# Patient Record
Sex: MALE | Race: WHITE | NOT HISPANIC OR LATINO | Employment: OTHER | URBAN - METROPOLITAN AREA
[De-identification: names, ages, dates, MRNs, and addresses within clinical notes are randomized per-mention and may not be internally consistent; named-entity substitution may affect disease eponyms.]

---

## 2017-02-02 RX ORDER — ATENOLOL 100 MG/1
TABLET ORAL
Qty: 90 TABLET | Refills: 0 | Status: SHIPPED | OUTPATIENT
Start: 2017-02-02 | End: 2017-04-29 | Stop reason: SDUPTHER

## 2017-02-02 RX ORDER — LOSARTAN POTASSIUM 100 MG/1
TABLET ORAL
Qty: 90 TABLET | Refills: 0 | Status: SHIPPED | OUTPATIENT
Start: 2017-02-02 | End: 2017-04-29 | Stop reason: SDUPTHER

## 2017-04-24 ENCOUNTER — OFFICE VISIT (OUTPATIENT)
Dept: CARDIOLOGY | Facility: CLINIC | Age: 67
End: 2017-04-24

## 2017-04-24 VITALS
BODY MASS INDEX: 27.55 KG/M2 | WEIGHT: 186 LBS | HEIGHT: 69 IN | OXYGEN SATURATION: 98 % | RESPIRATION RATE: 16 BRPM | DIASTOLIC BLOOD PRESSURE: 106 MMHG | SYSTOLIC BLOOD PRESSURE: 142 MMHG | HEART RATE: 70 BPM

## 2017-04-24 DIAGNOSIS — I25.10 CORONARY ARTERY DISEASE INVOLVING NATIVE CORONARY ARTERY OF NATIVE HEART WITHOUT ANGINA PECTORIS: Primary | ICD-10-CM

## 2017-04-24 DIAGNOSIS — I10 ESSENTIAL HYPERTENSION: ICD-10-CM

## 2017-04-24 DIAGNOSIS — E78.5 DYSLIPIDEMIA: ICD-10-CM

## 2017-04-24 DIAGNOSIS — I49.3 FREQUENT PVCS: ICD-10-CM

## 2017-04-24 DIAGNOSIS — R77.8 ELEVATED TROPONIN: ICD-10-CM

## 2017-04-24 PROCEDURE — 99213 OFFICE O/P EST LOW 20 MIN: CPT | Performed by: INTERNAL MEDICINE

## 2017-04-25 NOTE — PROGRESS NOTES
"Date of Office Visit: 2017  Encounter Provider: Yuan Vázquez MD  Place of Service: [facility] CARDIOLOGY  Patient Name: Jesus Islas  :1950    Chief complaint: Follow-up for coronary artery disease, PVC's, chronically elevated   troponin, and dyslipidemia.    History of Present Illness:    Dear Dr. Griggs:      I again had the pleasure of seeing your patient in cardiology office on 2017. As you   well know, he is a very pleasant 66 year-old white male with a past medical history   significant for FSH muscular dystrophy, frequent PVC's, coronary artery disease, and   dyslipidemia who presents for follow-up.      The patient was admitted on 2016 with palpitations for several days prior to the   admission. He had stated that his heart was \"skipping beats\". In the emergency   department, he was noted to have multiple PVCs, including patterns of bigeminy at   times. He had also had chest tightness and pressure 2 weeks prior to presentation,   and his troponin was elevated at 0.092 (cutoff for MI at 0.90). His troponin then came   back at 0.059 and again at 0.057 on the next 2 tests. He did wear a Holter monitor during   his hospital stay which showed an average of 110 PVCs per hour which were unifocal in   nature. He did have frequent bigeminal episodes, but no ventricular tachycardia. His TSH   was checked and was normal, and he was continued on his home dose of atenolol. He   then had a Lexiscan Cardiolite stress test performed on 2016 which showed   ischemia at the distal inferior wall and apex. A subsequent left heart catheterization was   performed on 2016 by Dr. August which showed significant disease in the LAD.   Specifically, he had 30% proximal disease, 50% mid disease, and 80-90% disease in the   apical portion of the LAD. However, the LAD was felt to be too small to intervene in the   apical area, and medical therapy was recommended. His " ejection fraction on the   ventriculogram was 55%. He was started on a very low dose of pravastatin at 10 mg per  day given his muscular dystrophy and a history of myalgias with statins in the past after   being diagnosed with this. He also has a history of significant dyslipidemia, including   grossly elevated triglycerides (greater than 1100 in the past), elevated LDL, and a low   HDL. It was later determined that he does have a chronically elevated troponin.      The patient presents today for follow-up.  Overall, he has been doing fairly well.    However, he has been having a significant amount of short-term memory loss.  He   states that his wife did some research and found that pravastatin can occasionally   be associated with this.  He has been exercising, and states that he has not had   any chest discomfort or significant shortness of breath.  He has been under a   significant amount of stress as his mother-in-law recently passed away.    Past Medical History:   Diagnosis Date   • Arthritis    • BPH (benign prostatic hyperplasia)    • CAD (coronary artery disease)    • Coronary artery disease     Cath 5/6/16: LAD 30% prox, 50% mid, 80-90% apical (small portion of vessel).  LCx and RCA both with up to 30% disease.  Medical management recommended at that time.   • Diverticulosis of colon    • Dyslipidemia     Including severe hypertriglyceridemia.  Also with elevated LDL and low HDL.   • Elevated troponin     Chronically elevated troponin (likely from muscular dystrophy)   • FSH (facioscapulohumeral muscular dystrophy)    • Hearing aid worn     Bilateral    • Hypertension    • Hypogonadism male    • SANDRA on CPAP    • PVC (premature ventricular contraction)     Frequent        Past Surgical History:   Procedure Laterality Date   • CARDIAC CATHETERIZATION N/A 5/6/2016    Procedure: Coronary angiography;  Surgeon: Ly August MD;  Location: [facility] CATH INVASIVE LOCATION;  Service:    • CARDIAC CATHETERIZATION  N/A 5/6/2016    Procedure: Left ventriculography;  Surgeon: Ly August MD;  Location: [facility] CATH INVASIVE LOCATION;  Service:    • CARDIAC CATHETERIZATION N/A 5/6/2016    Procedure: Left Heart Cath;  Surgeon: Ly August MD;  Location: [facility] CATH INVASIVE LOCATION;  Service:    • HERNIA REPAIR      Bilateral inguinal hernia repair and umbilical hernia repair in past    • [place] RT/LT HEART CATHETERS N/A 5/6/2016    Procedure: Percutaneous Coronary Intervention;  Surgeon: Ly August MD;  Location: [facility] CATH INVASIVE LOCATION;  Service: Cardiovascular       Current Outpatient Prescriptions on File Prior to Visit   Medication Sig Dispense Refill   • ASPIRIN LOW DOSE 81 MG EC tablet TAKE ONE TABLET BY MOUTH DAILY 30 tablet 6   • atenolol (TENORMIN) 100 MG tablet TAKE ONE TABLET BY MOUTH DAILY 90 tablet 0   • losartan (COZAAR) 100 MG tablet TAKE ONE TABLET BY MOUTH DAILY 90 tablet 0   • pravastatin (PRAVACHOL) 20 MG tablet TAKE ONE TABLET BY MOUTH DAILY 30 tablet 3   • tadalafil (CIALIS) 5 MG tablet Take  by mouth.     • Testosterone 20.25 MG/ACT (1.62%) gel Place  on the skin.       No current facility-administered medications on file prior to visit.      Allergies as of 04/24/2017 - Jesus as Reviewed 04/24/2017   Allergen Reaction Noted   • No known drug allergy  02/26/2016     Social History     Social History   • Marital status:      Spouse name: N/A   • Number of children: N/A   • Years of education: N/A     Occupational History   • Not on file.     Social History Main Topics   • Smoking status: Never Smoker   • Smokeless tobacco: Never Used   • Alcohol use Yes      Comment: 3 beers on weekends   • Drug use: No   • Sexual activity: Defer     Other Topics Concern   • Not on file     Social History Narrative     Family History   Problem Relation Age of Onset   • Aneurysm Father 50     Ruptured cerebral aneurysm   • Cancer Sister    • Early death Sister        Review of Systems   All other systems  "reviewed and are negative.    Objective:     Vitals:    04/24/17 1120   BP: (!) 142/106   BP Location: Right arm   Patient Position: Sitting   Cuff Size: Adult   Pulse: 70   Resp: 16   SpO2: 98%   Weight: 186 lb (84.4 kg)   Height: 69\" (175.3 cm)     Body mass index is 27.47 kg/(m^2).    Physical Exam   Constitutional: He is oriented to person, place, and time. He appears well-developed and well-nourished.   HENT:   Head: Normocephalic and atraumatic.   Eyes: Conjunctivae are normal.   Neck: Neck supple.   Cardiovascular: Normal rate and regular rhythm.  Exam reveals no gallop and no friction rub.    No murmur heard.  Pulmonary/Chest: Effort normal and breath sounds normal.   Abdominal: Soft. There is no tenderness.   Musculoskeletal: He exhibits no edema.   Neurological: He is alert and oriented to person, place, and time.   Skin: Skin is warm.   Psychiatric: He has a normal mood and affect. His behavior is normal.     Lab Review:   Procedures    Cardiac Procedures:  1. Holter monitor on 5/5/2016: The average heart rate was 60 bpm with a minimum   heart rate of 44 bpm, and a maximum heart rate of 88 bpm. There were an average  of 110 PVCs per hour (which were unifocal). There was frequent ventricular bigeminy.   There were no ventricular arrhythmias.  2. Lexiscan Cardiolite stress test on 5/6/2016: There was a small to moderate size   and moderately reversible defect in the distal inferior wall and apex consistent with   ischemia.  3. Left heart catheterization on 5/6/2016 by Dr. August: The left main was normal. The   left circumflex had 30% proximal disease area the first obtuse marginal branch had   30% mid disease. The LAD had a 30% proximal lesion, 50% mid lesion, and 80-90%   disease near the apex (small portion of the vessel). Overall, the LAD appeared diffusely   diseased and severely calcified. The right coronary artery had 30% mid disease and 10%   distal disease. The ejection fraction was 55% on the " ventriculogram. Medical management   was recommended at that time.    Assessment:       Diagnosis Plan   1. Coronary artery disease involving native coronary artery of native heart without angina pectoris     2. Elevated troponin     3. Dyslipidemia     4. Essential hypertension       Plan:       As noted, the patient's main issue has been short term memory loss recently.  He is   concerned as there has been some studies have evidently shown memory loss with   pravastatin.  I feel that it is acceptable to hold the pravastatin for a trial period of 4-6   weeks to see if this improves.  If his memory does not improve, then he can simply   go back on the pravastatin for now.  Obviously, control of his cholesterol is important   given his coronary artery disease.  He has also tolerated low doses of Lipitor in the   past as well, which would be another option.  He will continue on the aspirin for his   coronary artery disease, as well as the atenolol.  His blood pressure has been   well-controlled at home, although it is slightly elevated today.  He states he will   watch this.  I will see him back in 6 months, and he will let me know about the   pravastatin in the near future.    Coronary Artery Disease  Assessment  • The patient has no angina    Plan  • Lifestyle modifications discussed include adhering to a heart healthy diet, avoidance of tobacco products, maintenance of a healthy weight, medication compliance, regular exercise and regular monitoring of cholesterol and blood pressure    Subjective - Objective  • Current antiplatelet therapy includes aspirin 81 mg

## 2017-04-26 RX ORDER — PRAVASTATIN SODIUM 20 MG
TABLET ORAL
Qty: 30 TABLET | Refills: 3 | Status: SHIPPED | OUTPATIENT
Start: 2017-04-26 | End: 2017-08-07 | Stop reason: SINTOL

## 2017-05-01 RX ORDER — LOSARTAN POTASSIUM 100 MG/1
TABLET ORAL
Qty: 90 TABLET | Refills: 0 | Status: SHIPPED | OUTPATIENT
Start: 2017-05-01 | End: 2017-07-26 | Stop reason: SDUPTHER

## 2017-05-01 RX ORDER — ATENOLOL 100 MG/1
TABLET ORAL
Qty: 90 TABLET | Refills: 0 | Status: SHIPPED | OUTPATIENT
Start: 2017-05-01 | End: 2017-07-26 | Stop reason: SDUPTHER

## 2017-06-13 ENCOUNTER — TELEPHONE (OUTPATIENT)
Dept: CARDIOLOGY | Facility: CLINIC | Age: 67
End: 2017-06-13

## 2017-06-13 NOTE — TELEPHONE ENCOUNTER
Pt called with update on being off of Pravastatin for 1 month. He states his memory loss has much improved since being off Pravastatin.   He would like to discuss what to do next.   Please advise   Pt's call back # [phone]  Thanks Abdias CORNEJO

## 2017-06-15 NOTE — TELEPHONE ENCOUNTER
Abdias,    Because his symptoms were so profound, I think I an going to leave him off statins altogether for now.  Could you please let him know?  Thanks    GM

## 2017-06-16 NOTE — TELEPHONE ENCOUNTER
I spoke with pt gave him information about hold statins.   He was ok with it.   He did mention that since stopping Pravastatin he has had issues with ED.  He states the Pravastatin has helped with ED.   He will call if this gets worse or other issues arise before his next appt.   Abdias grewal

## 2017-07-26 RX ORDER — LOSARTAN POTASSIUM 100 MG/1
TABLET ORAL
Qty: 90 TABLET | Refills: 0 | Status: SHIPPED | OUTPATIENT
Start: 2017-07-26 | End: 2017-10-21 | Stop reason: SDUPTHER

## 2017-07-26 RX ORDER — ATENOLOL 100 MG/1
TABLET ORAL
Qty: 90 TABLET | Refills: 0 | Status: SHIPPED | OUTPATIENT
Start: 2017-07-26 | End: 2018-01-08 | Stop reason: SDUPTHER

## 2017-08-07 DIAGNOSIS — I25.10 CORONARY ARTERY DISEASE INVOLVING NATIVE CORONARY ARTERY OF NATIVE HEART WITHOUT ANGINA PECTORIS: Primary | ICD-10-CM

## 2017-08-08 ENCOUNTER — LAB (OUTPATIENT)
Dept: LAB | Facility: HOSPITAL | Age: 67
End: 2017-08-08

## 2017-08-08 DIAGNOSIS — I25.10 CORONARY ARTERY DISEASE INVOLVING NATIVE CORONARY ARTERY OF NATIVE HEART WITHOUT ANGINA PECTORIS: ICD-10-CM

## 2017-08-08 LAB
ALBUMIN SERPL-MCNC: 4 G/DL (ref 3.5–5.2)
ALP SERPL-CCNC: 46 U/L (ref 39–117)
ALT SERPL W P-5'-P-CCNC: 21 U/L (ref 1–41)
ARTICHOKE IGE QN: 199 MG/DL (ref 0–100)
AST SERPL-CCNC: 16 U/L (ref 1–40)
BILIRUB CONJ SERPL-MCNC: <0.2 MG/DL (ref 0–0.3)
BILIRUB INDIRECT SERPL-MCNC: NORMAL MG/DL
BILIRUB SERPL-MCNC: 0.4 MG/DL (ref 0.1–1.2)
CHOLEST SERPL-MCNC: 302 MG/DL (ref 0–200)
CK SERPL-CCNC: 220 U/L (ref 20–200)
HDLC SERPL-MCNC: 41 MG/DL (ref 40–60)
LDLC SERPL CALC-MCNC: ABNORMAL MG/DL (ref 0–100)
LDLC/HDLC SERPL: ABNORMAL {RATIO}
PROT SERPL-MCNC: 7 G/DL (ref 6–8.5)
TRIGL SERPL-MCNC: 411 MG/DL (ref 0–150)
VLDLC SERPL-MCNC: ABNORMAL MG/DL (ref 5–40)

## 2017-08-08 PROCEDURE — 36415 COLL VENOUS BLD VENIPUNCTURE: CPT

## 2017-08-08 PROCEDURE — 82550 ASSAY OF CK (CPK): CPT

## 2017-08-08 PROCEDURE — 80076 HEPATIC FUNCTION PANEL: CPT

## 2017-08-08 PROCEDURE — 83721 ASSAY OF BLOOD LIPOPROTEIN: CPT

## 2017-08-08 PROCEDURE — 80061 LIPID PANEL: CPT

## 2017-10-11 ENCOUNTER — TELEPHONE (OUTPATIENT)
Dept: CARDIOLOGY | Facility: CLINIC | Age: 67
End: 2017-10-11

## 2017-10-11 NOTE — TELEPHONE ENCOUNTER
10/11/17  3:00 PM  Jesus Islas  1950    Home Phone [phone]   Mobile [phone]     Mr. Islas has a follow-up appt with Dr. Vázquez on 10/24. He wants to know if he should have lipid testing prior to this appt?    Emmy DENNIS RN

## 2017-10-13 DIAGNOSIS — I25.10 CORONARY ARTERY DISEASE INVOLVING NATIVE CORONARY ARTERY OF NATIVE HEART WITHOUT ANGINA PECTORIS: Primary | ICD-10-CM

## 2017-10-13 NOTE — TELEPHONE ENCOUNTER
Emmy,    Can you please let the patient know that I have placed an order for a lipid panel?  He should be fasting, and can go anytime prior to his appointment.  Thanks    RIMMA

## 2017-10-16 ENCOUNTER — LAB (OUTPATIENT)
Dept: LAB | Facility: HOSPITAL | Age: 67
End: 2017-10-16

## 2017-10-16 DIAGNOSIS — I25.10 CORONARY ARTERY DISEASE INVOLVING NATIVE CORONARY ARTERY OF NATIVE HEART WITHOUT ANGINA PECTORIS: ICD-10-CM

## 2017-10-16 LAB
ARTICHOKE IGE QN: 153 MG/DL (ref 0–100)
CHOLEST SERPL-MCNC: 279 MG/DL (ref 0–200)
HDLC SERPL-MCNC: 35 MG/DL (ref 40–60)
LDLC SERPL CALC-MCNC: ABNORMAL MG/DL (ref 0–100)
LDLC/HDLC SERPL: ABNORMAL {RATIO}
TRIGL SERPL-MCNC: 637 MG/DL (ref 0–150)
VLDLC SERPL-MCNC: ABNORMAL MG/DL (ref 5–40)

## 2017-10-16 PROCEDURE — 83721 ASSAY OF BLOOD LIPOPROTEIN: CPT

## 2017-10-16 PROCEDURE — 36415 COLL VENOUS BLD VENIPUNCTURE: CPT

## 2017-10-16 PROCEDURE — 80061 LIPID PANEL: CPT

## 2017-10-23 RX ORDER — LOSARTAN POTASSIUM 100 MG/1
TABLET ORAL
Qty: 90 TABLET | Refills: 0 | Status: SHIPPED | OUTPATIENT
Start: 2017-10-23 | End: 2018-01-19 | Stop reason: SDUPTHER

## 2017-10-24 ENCOUNTER — OFFICE VISIT (OUTPATIENT)
Dept: CARDIOLOGY | Facility: CLINIC | Age: 67
End: 2017-10-24

## 2017-10-24 VITALS
OXYGEN SATURATION: 99 % | HEART RATE: 54 BPM | SYSTOLIC BLOOD PRESSURE: 140 MMHG | HEIGHT: 69 IN | BODY MASS INDEX: 26.36 KG/M2 | WEIGHT: 178 LBS | DIASTOLIC BLOOD PRESSURE: 102 MMHG

## 2017-10-24 DIAGNOSIS — I10 ESSENTIAL HYPERTENSION: ICD-10-CM

## 2017-10-24 DIAGNOSIS — E78.5 HYPERLIPIDEMIA, UNSPECIFIED HYPERLIPIDEMIA TYPE: ICD-10-CM

## 2017-10-24 DIAGNOSIS — I49.3 FREQUENT PVCS: ICD-10-CM

## 2017-10-24 DIAGNOSIS — I25.10 CORONARY ARTERY DISEASE INVOLVING NATIVE CORONARY ARTERY OF NATIVE HEART WITHOUT ANGINA PECTORIS: Primary | ICD-10-CM

## 2017-10-24 DIAGNOSIS — E78.1 HYPERTRIGLYCERIDEMIA: ICD-10-CM

## 2017-10-24 PROCEDURE — 99214 OFFICE O/P EST MOD 30 MIN: CPT | Performed by: INTERNAL MEDICINE

## 2017-10-24 RX ORDER — ATORVASTATIN CALCIUM 20 MG/1
20 TABLET, FILM COATED ORAL DAILY
Qty: 30 TABLET | Refills: 11 | Status: SHIPPED | OUTPATIENT
Start: 2017-10-24 | End: 2018-01-16

## 2017-10-24 RX ORDER — ATENOLOL 50 MG/1
TABLET ORAL
COMMUNITY
Start: 2017-07-28 | End: 2017-10-24 | Stop reason: DRUGHIGH

## 2017-10-26 RX ORDER — ATENOLOL 50 MG/1
TABLET ORAL
Qty: 180 TABLET | Refills: 0 | OUTPATIENT
Start: 2017-10-26

## 2017-10-29 NOTE — PROGRESS NOTES
"Date of Office Visit: 10/24/2017  Encounter Provider: Yuan Vázquez MD  Place of Service: [facility] CARDIOLOGY  Patient Name: Jesus Islas  :1950    Chief complaint: Follow-up coronary artery disease, PVCs, chronically elevated troponin,   dyslipidemia, and hypertriglyceridemia.    History of Present Illness:    Dear Dr. Griggs:      I again had the pleasure of seeing your patient in cardiology office on 10/24/2017. As you   well know, he is a very pleasant 67 year-old white male with a past medical history   significant for FSH muscular dystrophy, frequent PVC's, coronary artery disease, and   dyslipidemia who presents for follow-up.       The patient was admitted on 2016 with palpitations for several days prior to the   admission. He had stated that his heart was \"skipping beats\". In the emergency   department, he was noted to have multiple PVCs, including patterns of bigeminy at   times. He had also had chest tightness and pressure 2 weeks prior to presentation,   and his troponin was elevated at 0.092 (cutoff for MI at 0.90). His troponin then came   back at 0.059 and again at 0.057 on the next 2 tests. He did wear a Holter monitor during   his hospital stay which showed an average of 110 PVCs per hour which were unifocal in   nature. He did have frequent bigeminal episodes, but no ventricular tachycardia. His TSH   was checked and was normal, and he was continued on his home dose of atenolol. He   then had a Lexiscan Cardiolite stress test performed on 2016 which showed   ischemia at the distal inferior wall and apex. A subsequent left heart catheterization was   performed on 2016 by Dr. August which showed significant disease in the LAD.   Specifically, he had 30% proximal disease, 50% mid disease, and 80-90% disease in the   apical portion of the LAD. However, the LAD was felt to be too small to intervene in the   apical area, and medical therapy was " recommended. His ejection fraction on the   ventriculogram was 55%. He was started on a very low dose of pravastatin at 10 mg per  day given his muscular dystrophy and a history of myalgias with statins in the past after   being diagnosed with this. He also has a history of significant dyslipidemia, including   grossly elevated triglycerides (greater than 1100 in the past), elevated LDL, and a low   HDL. It was later determined that he does have a chronically elevated troponin.      The patient presents today for follow-up.  From a symptomatic standpoint, he is doing   well.  He has had no chest pain or shortness of breath.  He does state that his mind   cleared after getting off of the pravastatin.  However, his most recent lipid panel on   10/16/2017 showed significant issues.  Specifically, his triglycerides were 637, LDL   153, HDL 35, and total cholesterol 279.    Past Medical History:   Diagnosis Date   • Arthritis    • BPH (benign prostatic hyperplasia)    • CAD (coronary artery disease)    • Coronary artery disease     Cath 5/6/16: LAD 30% prox, 50% mid, 80-90% apical (small portion of vessel).  LCx and RCA both with up to 30% disease.  Medical management recommended at that time.   • Diverticulosis of colon    • Dyslipidemia     Including severe hypertriglyceridemia.  Also with elevated LDL and low HDL.   • Elevated troponin     Chronically elevated troponin (likely from muscular dystrophy)   • FSH (facioscapulohumeral muscular dystrophy)    • Hearing aid worn     Bilateral    • Hypertension    • Hypogonadism male    • SANDRA on CPAP    • PVC (premature ventricular contraction)     Frequent        Past Surgical History:   Procedure Laterality Date   • CARDIAC CATHETERIZATION N/A 5/6/2016    Procedure: Coronary angiography;  Surgeon: Ly August MD;  Location: [facility] INVASIVE LOCATION;  Service:    • CARDIAC CATHETERIZATION N/A 5/6/2016    Procedure: Left ventriculography;  Surgeon: Ly August MD;   [facility] CATH INVASIVE LOCATION;  Service:    • CARDIAC CATHETERIZATION N/A 5/6/2016    Procedure: Left Heart Cath;  Surgeon: Ly August MD;  Location[facility] CATH INVASIVE LOCATION;  Service:    • HERNIA REPAIR      Bilateral inguinal hernia repair and umbilical hernia repair in past    • [place] RT/LT HEART CATHETERS N/A 5/6/2016    Procedure: Percutaneous Coronary Intervention;  Surgeon: Ly August MD;  Location: [facility] CATH INVASIVE LOCATION;  Service: Cardiovascular       Current Outpatient Prescriptions on File Prior to Visit   Medication Sig Dispense Refill   • ASPIRIN LOW DOSE 81 MG EC tablet TAKE ONE TABLET BY MOUTH DAILY 30 tablet 6   • atenolol (TENORMIN) 100 MG tablet TAKE ONE TABLET BY MOUTH DAILY 90 tablet 0   • losartan (COZAAR) 100 MG tablet TAKE ONE TABLET BY MOUTH DAILY 90 tablet 0   • tadalafil (CIALIS) 5 MG tablet Take  by mouth.     • Testosterone 20.25 MG/ACT (1.62%) gel Place  on the skin.       No current facility-administered medications on file prior to visit.      Allergies as of 10/24/2017 - Jesus as Reviewed 10/24/2017   Allergen Reaction Noted   • Pravastatin  10/24/2017     Social History     Social History   • Marital status:      Spouse name: N/A   • Number of children: N/A   • Years of education: N/A     Occupational History   • Not on file.     Social History Main Topics   • Smoking status: Never Smoker   • Smokeless tobacco: Never Used   • Alcohol use Yes      Comment: 3 beers on weekends   • Drug use: No   • Sexual activity: Defer     Other Topics Concern   • Not on file     Social History Narrative     Family History   Problem Relation Age of Onset   • Aneurysm Father 50     Ruptured cerebral aneurysm   • Cancer Sister    • Early death Sister        Review of Systems   All other systems reviewed and are negative.    Objective:     Vitals:    10/24/17 1140   BP: (!) 140/102   BP Location: Left arm   Pulse: 54   SpO2: 99%   Weight: 178 lb (80.7 kg)   Height:  "69\" (175.3 cm)     Body mass index is 26.29 kg/(m^2).    Physical Exam   Constitutional: He is oriented to person, place, and time. He appears well-developed and well-nourished.   HENT:   Head: Normocephalic and atraumatic.   Eyes: Conjunctivae are normal.   Neck: Neck supple.   Cardiovascular: Normal rate and regular rhythm.  Exam reveals no gallop and no friction rub.    No murmur heard.  Pulmonary/Chest: Effort normal and breath sounds normal.   Abdominal: Soft. There is no tenderness.   Musculoskeletal: He exhibits no edema.   Neurological: He is alert and oriented to person, place, and time.   Skin: Skin is warm.   Psychiatric: He has a normal mood and affect. His behavior is normal.     Lab Review:   Procedures    Cardiac Procedures:  1. Holter monitor on 5/5/2016: The average heart rate was 60 bpm with a minimum   heart rate of 44 bpm, and a maximum heart rate of 88 bpm. There were an average  of 110 PVCs per hour (which were unifocal). There was frequent ventricular bigeminy.   There were no ventricular arrhythmias.  2. Lexiscan Cardiolite stress test on 5/6/2016: There was a small to moderate size   and moderately reversible defect in the distal inferior wall and apex consistent with   ischemia.  3. Left heart catheterization on 5/6/2016 by Dr. August: The left main was normal. The   left circumflex had 30% proximal disease area the first obtuse marginal branch had   30% mid disease. The LAD had a 30% proximal lesion, 50% mid lesion, and 80-90%   disease near the apex (small portion of the vessel). Overall, the LAD appeared   diffusely diseased and severely calcified. The right coronary artery had 30% mid   disease and 10% distal disease. The ejection fraction was 55% on the   ventriculogram. Medical management was recommended at that time.    Assessment:       Diagnosis Plan   1. Coronary artery disease involving native coronary artery of native heart without angina pectoris  Lipid Panel    CK    Hepatic " Function Panel   2. Hyperlipidemia, unspecified hyperlipidemia type  Lipid Panel    CK    Hepatic Function Panel   3. Hypertriglyceridemia  Lipid Panel    CK    Hepatic Function Panel   4. Essential hypertension     5. Frequent PVCs       Plan:       From a symptomatic standpoint, the patient is actually doing very well.  He has not   felt any recent PVCs, and he has had no chest pain or other exertional symptoms.    However, his lipid panel from 10/16/2017 showed significant dyslipidemia.  His   triglycerides were 637, and they had previously been 411.  Again, he has had   triglyceride levels as high as 1100 and the past.  His total cholesterol was 279,   HDL 35, and .  He did have significant memory and mental issues while   taking pravastatin.  However, he states that he did fairly well with Lipitor in the past,   and he would like to retry this.  I did discuss the possibility of specific directed   triglyceride therapy, although he would prefer to restart the Lipitor at this point.  He   had experienced some dietary indiscretions and had recently had some beer as   well around the time that the lipid panel was checked.  He is going to try to make   dietary modifications as well for the triglycerides.  He will have his lipid panel, CK   level, and liver function tests checked in 4-6 weeks, and adjustments can be   made at that time.  He will continue on the aspirin at 81 mg per day.  He will also   continue on the losartan and atenolol.  I will see him back in the office in 6 months,   and his lipid therapy will be readjusted after his labs in 4-6 weeks.    Coronary Artery Disease  Assessment  • The patient has no angina   Plan  • Lifestyle modifications discussed include adhering to a heart healthy diet, avoidance of tobacco products, maintenance of a healthy weight, medication compliance, regular exercise and regular monitoring of cholesterol and blood pressure   Subjective - Objective  • Current  antiplatelet therapy includes aspirin 81 mg

## 2018-01-08 ENCOUNTER — TELEPHONE (OUTPATIENT)
Dept: FAMILY MEDICINE CLINIC | Facility: CLINIC | Age: 68
End: 2018-01-08

## 2018-01-08 RX ORDER — ATENOLOL 100 MG/1
100 TABLET ORAL DAILY
Qty: 30 TABLET | Refills: 0 | Status: SHIPPED | OUTPATIENT
Start: 2018-01-08 | End: 2018-01-31 | Stop reason: SDUPTHER

## 2018-01-12 ENCOUNTER — TELEPHONE (OUTPATIENT)
Dept: CARDIOLOGY | Facility: CLINIC | Age: 68
End: 2018-01-12

## 2018-01-12 NOTE — TELEPHONE ENCOUNTER
01/12/18  1:36 PM  Jesus Islas  1950    Home Phone [phone]   Mobile [phone]     Mr. Islas calls to report loss of strength in upper extremities since taking atorvastatin. I spoke with Dr. áVzquez about pt who advises that he stop atorvastatin and he will look into repatha for this patient. Mr. Islas is reluctant to try repatha and wants to try half dose atorvastatin if next lipid panel shows improvement. This is ok per Dr. Vázquez.    Mr. Islas will get lipids drawn next week and await results. He will try 10mg atorvastatin daily if labs are good.    Emmy DENNIS RN

## 2018-01-15 ENCOUNTER — LAB (OUTPATIENT)
Dept: LAB | Facility: HOSPITAL | Age: 68
End: 2018-01-15

## 2018-01-15 DIAGNOSIS — E78.1 HYPERTRIGLYCERIDEMIA: ICD-10-CM

## 2018-01-15 DIAGNOSIS — E78.5 HYPERLIPIDEMIA, UNSPECIFIED HYPERLIPIDEMIA TYPE: ICD-10-CM

## 2018-01-15 DIAGNOSIS — I25.10 CORONARY ARTERY DISEASE INVOLVING NATIVE CORONARY ARTERY OF NATIVE HEART WITHOUT ANGINA PECTORIS: ICD-10-CM

## 2018-01-15 LAB
ALBUMIN SERPL-MCNC: 4.2 G/DL (ref 3.5–5.2)
ALP SERPL-CCNC: 57 U/L (ref 39–117)
ALT SERPL W P-5'-P-CCNC: 37 U/L (ref 1–41)
AST SERPL-CCNC: 24 U/L (ref 1–40)
BILIRUB CONJ SERPL-MCNC: <0.2 MG/DL (ref 0–0.3)
BILIRUB INDIRECT SERPL-MCNC: NORMAL MG/DL
BILIRUB SERPL-MCNC: 0.5 MG/DL (ref 0.1–1.2)
CHOLEST SERPL-MCNC: 222 MG/DL (ref 0–200)
CK SERPL-CCNC: 216 U/L (ref 20–200)
HDLC SERPL-MCNC: 47 MG/DL (ref 40–60)
LDLC SERPL CALC-MCNC: 113 MG/DL (ref 0–100)
LDLC/HDLC SERPL: 2.4 {RATIO}
PROT SERPL-MCNC: 7.6 G/DL (ref 6–8.5)
TRIGL SERPL-MCNC: 312 MG/DL (ref 0–150)
VLDLC SERPL-MCNC: 62.4 MG/DL (ref 5–40)

## 2018-01-15 PROCEDURE — 82550 ASSAY OF CK (CPK): CPT

## 2018-01-15 PROCEDURE — 80076 HEPATIC FUNCTION PANEL: CPT

## 2018-01-15 PROCEDURE — 36415 COLL VENOUS BLD VENIPUNCTURE: CPT

## 2018-01-15 PROCEDURE — 80061 LIPID PANEL: CPT

## 2018-01-16 RX ORDER — ATORVASTATIN CALCIUM 10 MG/1
10 TABLET, FILM COATED ORAL DAILY
Qty: 30 TABLET | Refills: 11 | Status: SHIPPED | OUTPATIENT
Start: 2018-01-16 | End: 2018-12-08 | Stop reason: SDUPTHER

## 2018-01-19 RX ORDER — LOSARTAN POTASSIUM 100 MG/1
TABLET ORAL
Qty: 90 TABLET | Refills: 0 | Status: SHIPPED | OUTPATIENT
Start: 2018-01-19 | End: 2018-04-18 | Stop reason: SDUPTHER

## 2018-02-01 RX ORDER — ATENOLOL 100 MG/1
TABLET ORAL
Qty: 30 TABLET | Refills: 0 | Status: SHIPPED | OUTPATIENT
Start: 2018-02-01 | End: 2018-02-17

## 2018-02-06 RX ORDER — ATENOLOL 100 MG/1
TABLET ORAL
Qty: 30 TABLET | Refills: 0 | Status: SHIPPED | OUTPATIENT
Start: 2018-02-06 | End: 2018-04-24 | Stop reason: SDUPTHER

## 2018-02-13 ENCOUNTER — TELEPHONE (OUTPATIENT)
Dept: URGENT CARE | Facility: CLINIC | Age: 68
End: 2018-02-13

## 2018-02-17 ENCOUNTER — APPOINTMENT (OUTPATIENT)
Dept: GENERAL RADIOLOGY | Facility: HOSPITAL | Age: 68
End: 2018-02-17

## 2018-02-17 PROCEDURE — 71046 X-RAY EXAM CHEST 2 VIEWS: CPT | Performed by: GENERAL PRACTICE

## 2018-02-28 RX ORDER — ATENOLOL 100 MG/1
TABLET ORAL
Qty: 30 TABLET | Refills: 0 | Status: SHIPPED | OUTPATIENT
Start: 2018-02-28 | End: 2018-04-29 | Stop reason: SDUPTHER

## 2018-04-18 RX ORDER — LOSARTAN POTASSIUM 100 MG/1
TABLET ORAL
Qty: 90 TABLET | Refills: 0 | Status: SHIPPED | OUTPATIENT
Start: 2018-04-18 | End: 2018-07-13 | Stop reason: SDUPTHER

## 2018-04-23 ENCOUNTER — LAB (OUTPATIENT)
Dept: LAB | Facility: HOSPITAL | Age: 68
End: 2018-04-23

## 2018-04-23 DIAGNOSIS — E78.5 HYPERLIPIDEMIA, UNSPECIFIED HYPERLIPIDEMIA TYPE: Primary | ICD-10-CM

## 2018-04-23 DIAGNOSIS — E78.5 HYPERLIPIDEMIA, UNSPECIFIED HYPERLIPIDEMIA TYPE: ICD-10-CM

## 2018-04-23 LAB
ALBUMIN SERPL-MCNC: 4.1 G/DL (ref 3.5–5.2)
ALP SERPL-CCNC: 44 U/L (ref 39–117)
ALT SERPL W P-5'-P-CCNC: 28 U/L (ref 1–41)
ARTICHOKE IGE QN: 171 MG/DL (ref 0–100)
AST SERPL-CCNC: 27 U/L (ref 1–40)
BILIRUB CONJ SERPL-MCNC: <0.2 MG/DL (ref 0–0.3)
BILIRUB INDIRECT SERPL-MCNC: NORMAL MG/DL
BILIRUB SERPL-MCNC: 0.5 MG/DL (ref 0.1–1.2)
CHOLEST SERPL-MCNC: 261 MG/DL (ref 0–200)
HDLC SERPL-MCNC: 46 MG/DL (ref 40–60)
LDLC SERPL CALC-MCNC: ABNORMAL MG/DL (ref 0–100)
LDLC/HDLC SERPL: ABNORMAL {RATIO}
PROT SERPL-MCNC: 6.8 G/DL (ref 6–8.5)
TRIGL SERPL-MCNC: 406 MG/DL (ref 0–150)
VLDLC SERPL-MCNC: ABNORMAL MG/DL (ref 5–40)

## 2018-04-23 PROCEDURE — 80076 HEPATIC FUNCTION PANEL: CPT

## 2018-04-23 PROCEDURE — 80061 LIPID PANEL: CPT

## 2018-04-23 PROCEDURE — 83721 ASSAY OF BLOOD LIPOPROTEIN: CPT

## 2018-04-23 PROCEDURE — 36415 COLL VENOUS BLD VENIPUNCTURE: CPT

## 2018-04-24 ENCOUNTER — OFFICE VISIT (OUTPATIENT)
Dept: CARDIOLOGY | Facility: CLINIC | Age: 68
End: 2018-04-24

## 2018-04-24 VITALS
SYSTOLIC BLOOD PRESSURE: 128 MMHG | BODY MASS INDEX: 26.81 KG/M2 | HEART RATE: 56 BPM | OXYGEN SATURATION: 95 % | HEIGHT: 69 IN | DIASTOLIC BLOOD PRESSURE: 80 MMHG | WEIGHT: 181 LBS

## 2018-04-24 DIAGNOSIS — I49.3 FREQUENT PVCS: ICD-10-CM

## 2018-04-24 DIAGNOSIS — E78.5 DYSLIPIDEMIA: ICD-10-CM

## 2018-04-24 DIAGNOSIS — R77.8 ELEVATED TROPONIN: ICD-10-CM

## 2018-04-24 DIAGNOSIS — E78.1 HYPERTRIGLYCERIDEMIA: ICD-10-CM

## 2018-04-24 DIAGNOSIS — I25.10 CORONARY ARTERY DISEASE INVOLVING NATIVE CORONARY ARTERY OF NATIVE HEART WITHOUT ANGINA PECTORIS: Primary | ICD-10-CM

## 2018-04-24 PROCEDURE — 99214 OFFICE O/P EST MOD 30 MIN: CPT | Performed by: INTERNAL MEDICINE

## 2018-04-24 RX ORDER — ICOSAPENT ETHYL 1000 MG/1
2 CAPSULE ORAL 2 TIMES DAILY WITH MEALS
Qty: 120 CAPSULE | Refills: 11 | Status: SHIPPED | OUTPATIENT
Start: 2018-04-24 | End: 2019-05-19 | Stop reason: SDUPTHER

## 2018-04-30 RX ORDER — ATENOLOL 100 MG/1
TABLET ORAL
Qty: 30 TABLET | Refills: 0 | Status: SHIPPED | OUTPATIENT
Start: 2018-04-30 | End: 2018-06-02 | Stop reason: SDUPTHER

## 2018-05-23 ENCOUNTER — LAB (OUTPATIENT)
Dept: LAB | Facility: HOSPITAL | Age: 68
End: 2018-05-23

## 2018-05-23 DIAGNOSIS — I25.10 CORONARY ARTERY DISEASE INVOLVING NATIVE CORONARY ARTERY OF NATIVE HEART WITHOUT ANGINA PECTORIS: ICD-10-CM

## 2018-05-23 LAB
CHOLEST SERPL-MCNC: 231 MG/DL (ref 0–200)
HDLC SERPL-MCNC: 46 MG/DL (ref 40–60)
LDLC SERPL CALC-MCNC: 143 MG/DL (ref 0–100)
LDLC/HDLC SERPL: 3.1 {RATIO}
TRIGL SERPL-MCNC: 212 MG/DL (ref 0–150)
VLDLC SERPL-MCNC: 42.4 MG/DL (ref 5–40)

## 2018-05-23 PROCEDURE — 80061 LIPID PANEL: CPT

## 2018-05-23 PROCEDURE — 36415 COLL VENOUS BLD VENIPUNCTURE: CPT

## 2018-05-24 ENCOUNTER — TELEPHONE (OUTPATIENT)
Dept: URGENT CARE | Facility: CLINIC | Age: 68
End: 2018-05-24

## 2018-06-04 RX ORDER — ATENOLOL 100 MG/1
TABLET ORAL
Qty: 30 TABLET | Refills: 0 | Status: SHIPPED | OUTPATIENT
Start: 2018-06-04 | End: 2018-06-30 | Stop reason: SDUPTHER

## 2018-07-02 RX ORDER — ATENOLOL 100 MG/1
TABLET ORAL
Qty: 30 TABLET | Refills: 0 | Status: SHIPPED | OUTPATIENT
Start: 2018-07-02 | End: 2018-07-30 | Stop reason: SDUPTHER

## 2018-07-13 RX ORDER — LOSARTAN POTASSIUM 100 MG/1
TABLET ORAL
Qty: 30 TABLET | Refills: 0 | Status: SHIPPED | OUTPATIENT
Start: 2018-07-13 | End: 2018-08-12 | Stop reason: SDUPTHER

## 2018-07-30 RX ORDER — ATENOLOL 100 MG/1
TABLET ORAL
Qty: 30 TABLET | Refills: 0 | Status: SHIPPED | OUTPATIENT
Start: 2018-07-30 | End: 2018-08-26 | Stop reason: SDUPTHER

## 2018-08-13 RX ORDER — LOSARTAN POTASSIUM 100 MG/1
TABLET ORAL
Qty: 30 TABLET | Refills: 0 | Status: SHIPPED | OUTPATIENT
Start: 2018-08-13 | End: 2018-09-15 | Stop reason: SDUPTHER

## 2018-08-27 RX ORDER — ATENOLOL 100 MG/1
TABLET ORAL
Qty: 30 TABLET | Refills: 0 | Status: SHIPPED | OUTPATIENT
Start: 2018-08-27 | End: 2018-09-23 | Stop reason: SDUPTHER

## 2018-09-17 RX ORDER — LOSARTAN POTASSIUM 100 MG/1
TABLET ORAL
Qty: 30 TABLET | Refills: 0 | Status: SHIPPED | OUTPATIENT
Start: 2018-09-17 | End: 2018-11-13 | Stop reason: SDUPTHER

## 2018-09-24 RX ORDER — ATENOLOL 100 MG/1
TABLET ORAL
Qty: 30 TABLET | Refills: 0 | Status: SHIPPED | OUTPATIENT
Start: 2018-09-24 | End: 2018-11-06 | Stop reason: SDUPTHER

## 2018-10-16 ENCOUNTER — TELEPHONE (OUTPATIENT)
Dept: CARDIOLOGY | Facility: CLINIC | Age: 68
End: 2018-10-16

## 2018-10-16 DIAGNOSIS — I25.10 CORONARY ARTERY DISEASE INVOLVING NATIVE CORONARY ARTERY OF NATIVE HEART WITHOUT ANGINA PECTORIS: Primary | ICD-10-CM

## 2018-10-16 NOTE — TELEPHONE ENCOUNTER
10/16/18  Pt called would like to have labs drawn before his upcoming appt on Monday 10/29/18.   He would need an order placed  Thanks Abdias grewal

## 2018-10-17 ENCOUNTER — LAB (OUTPATIENT)
Dept: LAB | Facility: HOSPITAL | Age: 68
End: 2018-10-17

## 2018-10-17 DIAGNOSIS — I25.10 CORONARY ARTERY DISEASE INVOLVING NATIVE CORONARY ARTERY OF NATIVE HEART WITHOUT ANGINA PECTORIS: ICD-10-CM

## 2018-10-17 LAB
ALBUMIN SERPL-MCNC: 4.3 G/DL (ref 3.5–5.2)
ALP SERPL-CCNC: 49 U/L (ref 39–117)
ALT SERPL W P-5'-P-CCNC: 32 U/L (ref 1–41)
AST SERPL-CCNC: 26 U/L (ref 1–40)
BILIRUB CONJ SERPL-MCNC: <0.2 MG/DL (ref 0–0.3)
BILIRUB INDIRECT SERPL-MCNC: NORMAL MG/DL
BILIRUB SERPL-MCNC: 0.4 MG/DL (ref 0.1–1.2)
CHOLEST SERPL-MCNC: 231 MG/DL (ref 0–200)
HDLC SERPL-MCNC: 53 MG/DL (ref 40–60)
LDLC SERPL CALC-MCNC: 143 MG/DL (ref 0–100)
LDLC/HDLC SERPL: 2.71 {RATIO}
PROT SERPL-MCNC: 7.4 G/DL (ref 6–8.5)
TRIGL SERPL-MCNC: 173 MG/DL (ref 0–150)
VLDLC SERPL-MCNC: 34.6 MG/DL (ref 5–40)

## 2018-10-17 PROCEDURE — 80076 HEPATIC FUNCTION PANEL: CPT

## 2018-10-17 PROCEDURE — 36415 COLL VENOUS BLD VENIPUNCTURE: CPT

## 2018-10-17 PROCEDURE — 80061 LIPID PANEL: CPT

## 2018-10-22 RX ORDER — ATENOLOL 100 MG/1
TABLET ORAL
Qty: 30 TABLET | Refills: 0 | OUTPATIENT
Start: 2018-10-22

## 2018-10-29 ENCOUNTER — OFFICE VISIT (OUTPATIENT)
Dept: CARDIOLOGY | Facility: CLINIC | Age: 68
End: 2018-10-29

## 2018-10-29 VITALS
BODY MASS INDEX: 26.33 KG/M2 | HEART RATE: 56 BPM | SYSTOLIC BLOOD PRESSURE: 114 MMHG | OXYGEN SATURATION: 96 % | WEIGHT: 177.8 LBS | HEIGHT: 69 IN | DIASTOLIC BLOOD PRESSURE: 74 MMHG

## 2018-10-29 DIAGNOSIS — I25.10 CORONARY ARTERY DISEASE INVOLVING NATIVE CORONARY ARTERY OF NATIVE HEART WITHOUT ANGINA PECTORIS: Primary | ICD-10-CM

## 2018-10-29 DIAGNOSIS — I49.3 PVC'S (PREMATURE VENTRICULAR CONTRACTIONS): ICD-10-CM

## 2018-10-29 DIAGNOSIS — E78.1 HYPERTRIGLYCERIDEMIA: ICD-10-CM

## 2018-10-29 DIAGNOSIS — R77.8 ELEVATED TROPONIN: ICD-10-CM

## 2018-10-29 DIAGNOSIS — E78.5 DYSLIPIDEMIA: ICD-10-CM

## 2018-10-29 PROCEDURE — 99213 OFFICE O/P EST LOW 20 MIN: CPT | Performed by: INTERNAL MEDICINE

## 2018-11-01 NOTE — PROGRESS NOTES
"Date of Office Visit: 10/29/2018  Encounter Provider: Yuan Vázquez MD  Place of Service: [facility] CARDIOLOGY  Patient Name: Jesus Islas  :1950    Chief complaint: Follow-up coronary artery disease, PVCs, chronically elevated   troponin, dyslipidemia, and hypertriglyceridemia.    History of Present Illness:    Dear Dr. Griggs:      I again had the pleasure of seeing your patient in cardiology office on 10/29/2018. As you   well know, he is a very pleasant 68 year-old white male with a past medical history   significant for FSH muscular dystrophy, frequent PVC's, coronary artery disease, and   dyslipidemia who presents for follow-up.       The patient was admitted on 2016 with palpitations for several days prior to the   admission. He had stated that his heart was \"skipping beats\". In the emergency   department, he was noted to have multiple PVCs, including patterns of bigeminy at   times. He had also had chest tightness and pressure 2 weeks prior to presentation,   and his troponin was elevated at 0.092 (cutoff for MI at 0.90). His troponin then came   back at 0.059 and again at 0.057 on the next 2 tests. He did wear a Holter monitor during   his hospital stay which showed an average of 110 PVCs per hour which were unifocal in   nature. He did have frequent bigeminal episodes, but no ventricular tachycardia. His TSH   was checked and was normal, and he was continued on his home dose of atenolol. He   then had a Lexiscan Cardiolite stress test performed on 2016 which showed   ischemia at the distal inferior wall and apex. A subsequent left heart catheterization was   performed on 2016 by Dr. August which showed significant disease in the LAD.   Specifically, he had 30% proximal disease, 50% mid disease, and 80-90% disease in the   apical portion of the LAD. However, the LAD was felt to be too small to intervene in the   apical area, and medical therapy was " recommended. His ejection fraction on the   ventriculogram was 55%. He was started on a very low dose of pravastatin at 10 mg per  day given his muscular dystrophy and a history of myalgias with statins in the past after   being diagnosed with this. He also has a history of significant dyslipidemia, including   grossly elevated triglycerides (greater than 1100 in the past), elevated LDL, and a low   HDL. It was later determined that he does have a chronically elevated troponin.  He   did develop memory impairment with pravastatin, but has tolerated low-dose Lipitor.      The patient presents today for follow-up.  Overall, he has been doing fairly well.  He has   cut his Vascepa back to one tablet in the morning and 2 at night.  He told me that when   he was taking 2 in the morning, he was developing more fatigue.  He has not had any   chest pain, palpitations, or shortness of breath.  His recent lipid panel is discussed below.    Past Medical History:   Diagnosis Date   • Arthritis    • BPH (benign prostatic hyperplasia)    • CAD (coronary artery disease)    • Coronary artery disease     Cath 5/6/16: LAD 30% prox, 50% mid, 80-90% apical (small portion of vessel).  LCx and RCA both with up to 30% disease.  Medical management recommended at that time.   • Diverticulosis of colon    • Dyslipidemia     Including severe hypertriglyceridemia.  Also with elevated LDL and low HDL.   • Elevated troponin     Chronically elevated troponin (likely from muscular dystrophy)   • FSH (facioscapulohumeral muscular dystrophy) (CMS/HCC)    • Hearing aid worn     Bilateral    • Hypertension    • Hypogonadism male    • SANDRA on CPAP    • PVC (premature ventricular contraction)     Frequent        Past Surgical History:   Procedure Laterality Date   • CARDIAC CATHETERIZATION N/A 5/6/2016    Procedure: Coronary angiography;  Surgeon: Ly August MD;  Location: [facility] CATH INVASIVE LOCATION;  Service:    • CARDIAC CATHETERIZATION N/A  5/6/2016    Procedure: Left ventriculography;  Surgeon: Ly August MD;  Location: [facility] CATH INVASIVE LOCATION;  Service:    • CARDIAC CATHETERIZATION N/A 5/6/2016    Procedure: Left Heart Cath;  Surgeon: Ly August MD;  [facility];  Service:    • HERNIA REPAIR      Bilateral inguinal hernia repair and umbilical hernia repair in past    • MO RT/LT HEART CATHETERS N/A 5/6/2016    Procedure: Percutaneous Coronary Intervention;  Surgeon: Ly August MD;  Location: [facility] CATH INVASIVE LOCATION;  Service: Cardiovascular       Current Outpatient Prescriptions on File Prior to Visit   Medication Sig Dispense Refill   • ASPIRIN LOW DOSE 81 MG EC tablet TAKE ONE TABLET BY MOUTH DAILY 30 tablet 6   • atenolol (TENORMIN) 100 MG tablet TAKE ONE TABLET BY MOUTH DAILY 30 tablet 0   • atorvastatin (LIPITOR) 10 MG tablet Take 1 tablet by mouth Daily. 30 tablet 11   • B Complex Vitamins (VITAMIN B COMPLEX PO) Take  by mouth.     • icosapent ethyl (VASCEPA) 1 g capsule capsule Take 2 g by mouth 2 (Two) Times a Day With Meals. 120 capsule 11   • losartan (COZAAR) 100 MG tablet TAKE ONE TABLET BY MOUTH DAILY 30 tablet 0   • tadalafil (CIALIS) 5 MG tablet Take  by mouth.     • Testosterone 20.25 MG/ACT (1.62%) gel Place  on the skin.       No current facility-administered medications on file prior to visit.      Allergies as of 10/29/2018 - Reviewed 05/23/2018   Allergen Reaction Noted   • Pravastatin  10/24/2017     Social History     Social History   • Marital status:      Spouse name: N/A   • Number of children: N/A   • Years of education: N/A     Occupational History   • Not on file.     Social History Main Topics   • Smoking status: Never Smoker   • Smokeless tobacco: Never Used   • Alcohol use Yes      Comment: 3 beers on weekends   • Drug use: No   • Sexual activity: Defer     Other Topics Concern   • Not on file     Social History Narrative   • No narrative on file     Family  "History   Problem Relation Age of Onset   • Aneurysm Father 50        Ruptured cerebral aneurysm   • Cancer Sister    • Early death Sister        Review of Systems   Constitution: Positive for malaise/fatigue.   HENT: Positive for hearing loss.    All other systems reviewed and are negative.     Objective:     Vitals:    10/29/18 1423   BP: 114/74   Pulse: 56   SpO2: 96%   Weight: 80.6 kg (177 lb 12.8 oz)   Height: 176.5 cm (69.49\")     Body mass index is 25.89 kg/m².    Physical Exam   Constitutional: He is oriented to person, place, and time. He appears well-developed and well-nourished.   HENT:   Head: Normocephalic and atraumatic.   Eyes: Conjunctivae are normal.   Neck: Neck supple.   Cardiovascular: Normal rate and regular rhythm.  Exam reveals no gallop and no friction rub.    No murmur heard.  Pulmonary/Chest: Effort normal and breath sounds normal.   Abdominal: Soft. There is no tenderness.   Musculoskeletal: He exhibits no edema.   Neurological: He is alert and oriented to person, place, and time.   Skin: Skin is warm.   Psychiatric: He has a normal mood and affect. His behavior is normal.     Lab Review:   Procedures    Cardiac Procedures:  1. Holter monitor on 5/5/2016: The average heart rate was 60 bpm with a minimum   heart rate of 44 bpm, and a maximum heart rate of 88 bpm. There were an average  of 110 PVCs per hour (which were unifocal). There was frequent ventricular bigeminy.   There were no ventricular arrhythmias.  2. Lexiscan Cardiolite stress test on 5/6/2016: There was a small to moderate size   and moderately reversible defect in the distal inferior wall and apex consistent with   ischemia.  3. Left heart catheterization on 5/6/2016 by Dr. August: The left main was normal. The   left circumflex had 30% proximal disease area the first obtuse marginal branch had   30% mid disease. The LAD had a 30% proximal lesion, 50% mid lesion, and 80-90%   disease near the apex (small portion of the " vessel). Overall, the LAD appeared   diffusely diseased and severely calcified. The right coronary artery had 30% mid   disease and 10% distal disease. The ejection fraction was 55% on the   ventriculogram. Medical management was recommended at that time.       Assessment:       Diagnosis Plan   1. Coronary artery disease involving native coronary artery of native heart without angina pectoris     2. PVC's (premature ventricular contractions)     3. Elevated troponin     4. Dyslipidemia     5. Hypertriglyceridemia       Plan:       The patient seems to be doing very well.  He is tolerating the Lipitor at 10 mg per day.  He   has had memory impairment with pravastatin in the past.  He is taking the Vascepa 1 g in   the morning and 2 g in the evening.  When taking 2 g in the morning, this caused fatigue.    His most recent lipid panel from 10/17/2018 showed a total cholesterol of 231, HDL 53, LDL   143, and triglycerides 173.  This is actually the best I have ever seen his cholesterol,   especially his triglycerides.  We will continue on the aspirin for his coronary artery disease.    He is tolerating the atenolol at 100 mg per day, and he does not feel any PVCs recently.  His   blood pressure appears to be excellent on the atenolol and losartan.  I did not change any   medications today.  I will see him back in the office in 6 months.    Coronary Artery Disease  Assessment  • The patient has no angina    Plan  • Lifestyle modifications discussed include adhering to a heart healthy diet, avoidance of tobacco products, maintenance of a healthy weight, medication compliance, regular exercise and regular monitoring of cholesterol and blood pressure    Subjective - Objective  • Current antiplatelet therapy includes aspirin 81 mg

## 2018-11-06 RX ORDER — ATENOLOL 100 MG/1
TABLET ORAL
Qty: 30 TABLET | Refills: 0 | Status: SHIPPED | OUTPATIENT
Start: 2018-11-06 | End: 2018-11-13 | Stop reason: SDUPTHER

## 2018-11-13 ENCOUNTER — TELEPHONE (OUTPATIENT)
Dept: CARDIOLOGY | Facility: CLINIC | Age: 68
End: 2018-11-13

## 2018-11-13 RX ORDER — LOSARTAN POTASSIUM 100 MG/1
100 TABLET ORAL DAILY
Qty: 30 TABLET | Refills: 5 | Status: SHIPPED | OUTPATIENT
Start: 2018-11-13 | End: 2019-04-14 | Stop reason: SDUPTHER

## 2018-11-13 RX ORDER — ATENOLOL 100 MG/1
100 TABLET ORAL DAILY
Qty: 30 TABLET | Refills: 5 | Status: SHIPPED | OUTPATIENT
Start: 2018-11-13 | End: 2019-05-27 | Stop reason: SDUPTHER

## 2018-11-13 NOTE — TELEPHONE ENCOUNTER
11/13/18  Pt left sg - states has not seen pcp recently and they refused to refill his losartan 100 mg and Metoprolol 100mg.  He states he can't get an appt for another 3 months.  He is asking if Dr. Vázquez would consider filling for him instead.  His ph [phone]/baron

## 2018-12-10 RX ORDER — ATORVASTATIN CALCIUM 10 MG/1
TABLET, FILM COATED ORAL
Qty: 90 TABLET | Refills: 10 | Status: SHIPPED | OUTPATIENT
Start: 2018-12-10 | End: 2020-03-02

## 2019-04-15 RX ORDER — LOSARTAN POTASSIUM 100 MG/1
TABLET ORAL
Qty: 90 TABLET | Refills: 4 | Status: SHIPPED | OUTPATIENT
Start: 2019-04-15 | End: 2020-07-22 | Stop reason: SDUPTHER

## 2019-04-29 DIAGNOSIS — I25.10 CORONARY ARTERY DISEASE INVOLVING NATIVE CORONARY ARTERY OF NATIVE HEART WITHOUT ANGINA PECTORIS: Primary | ICD-10-CM

## 2019-04-30 ENCOUNTER — LAB (OUTPATIENT)
Dept: LAB | Facility: HOSPITAL | Age: 69
End: 2019-04-30

## 2019-04-30 DIAGNOSIS — I25.10 CORONARY ARTERY DISEASE INVOLVING NATIVE CORONARY ARTERY OF NATIVE HEART WITHOUT ANGINA PECTORIS: ICD-10-CM

## 2019-04-30 LAB
ALBUMIN SERPL-MCNC: 4.2 G/DL (ref 3.5–5.2)
ALP SERPL-CCNC: 48 U/L (ref 39–117)
ALT SERPL W P-5'-P-CCNC: 24 U/L (ref 1–41)
AST SERPL-CCNC: 19 U/L (ref 1–40)
BILIRUB CONJ SERPL-MCNC: <0.2 MG/DL (ref 0.2–0.3)
BILIRUB INDIRECT SERPL-MCNC: ABNORMAL MG/DL
BILIRUB SERPL-MCNC: 0.4 MG/DL (ref 0.2–1.2)
CHOLEST SERPL-MCNC: 236 MG/DL (ref 0–200)
HDLC SERPL-MCNC: 43 MG/DL (ref 40–60)
LDLC SERPL CALC-MCNC: 134 MG/DL (ref 0–100)
LDLC/HDLC SERPL: 3.11 {RATIO}
PROT SERPL-MCNC: 6.8 G/DL (ref 6–8.5)
TRIGL SERPL-MCNC: 297 MG/DL (ref 0–150)
VLDLC SERPL-MCNC: 59.4 MG/DL (ref 5–40)

## 2019-04-30 PROCEDURE — 80061 LIPID PANEL: CPT

## 2019-04-30 PROCEDURE — 80076 HEPATIC FUNCTION PANEL: CPT

## 2019-04-30 PROCEDURE — 36415 COLL VENOUS BLD VENIPUNCTURE: CPT

## 2019-05-20 RX ORDER — ICOSAPENT ETHYL 1000 MG/1
CAPSULE ORAL
Qty: 120 CAPSULE | Refills: 10 | Status: SHIPPED | OUTPATIENT
Start: 2019-05-20 | End: 2020-07-22 | Stop reason: SDUPTHER

## 2019-05-24 ENCOUNTER — OFFICE VISIT (OUTPATIENT)
Dept: CARDIOLOGY | Facility: CLINIC | Age: 69
End: 2019-05-24

## 2019-05-24 VITALS
HEIGHT: 69 IN | OXYGEN SATURATION: 99 % | WEIGHT: 185.4 LBS | SYSTOLIC BLOOD PRESSURE: 112 MMHG | BODY MASS INDEX: 27.46 KG/M2 | DIASTOLIC BLOOD PRESSURE: 80 MMHG | HEART RATE: 56 BPM

## 2019-05-24 DIAGNOSIS — E78.5 HYPERLIPIDEMIA, UNSPECIFIED HYPERLIPIDEMIA TYPE: Primary | ICD-10-CM

## 2019-05-24 DIAGNOSIS — I25.10 CORONARY ARTERY DISEASE INVOLVING NATIVE CORONARY ARTERY OF NATIVE HEART WITHOUT ANGINA PECTORIS: ICD-10-CM

## 2019-05-24 DIAGNOSIS — I10 ESSENTIAL HYPERTENSION: ICD-10-CM

## 2019-05-24 PROCEDURE — 93000 ELECTROCARDIOGRAM COMPLETE: CPT | Performed by: NURSE PRACTITIONER

## 2019-05-24 PROCEDURE — 99214 OFFICE O/P EST MOD 30 MIN: CPT | Performed by: NURSE PRACTITIONER

## 2019-05-24 NOTE — PROGRESS NOTES
[facility]      Patient Name: Jesus Islas  :1950  Age: 68 y.o.  Primary Cardiologist: Kevon Vázquez MD  Encounter Provider:  SHIRA Durán      Chief Complaint:   Chief Complaint   Patient presents with   • Follow-up         HPI  Jesus Islas is a 68 y.o. male who presents today for semiannual reevaluation. Pt has a  history significant for artery disease, hypertension, hyperlipidemia.  Patient states that he has done pretty well over the past 6 months.  He does note that yesterday he had an episode of left-sided chest tightness.  He states that he woke in the morning with some left-sided thoracic pain that wraps around to the chest.  Reports that chest tightness lasted about half of the day.  States that he woke up this morning with a little bit of discomfort but it is currently pain-free.  He states that he did ride his bike for 2 consecutive days prior to this event 11 miles each way and did not have any exertional symptoms.  In fact patient states that he felt better during and after exercise.  He denies any episodes of shortness of breath, dyspnea on exertion, palpitations, lightheadedness, swelling, fatigue.  Blood pressure has been very well controlled.    The following portions of the patient's history were reviewed and updated as appropriate: allergies, current medications, past family history, past medical history, past social history, past surgical history and problem list.    Current Outpatient Medications on File Prior to Visit   Medication Sig   • ASPIRIN LOW DOSE 81 MG EC tablet TAKE ONE TABLET BY MOUTH DAILY   • atenolol (TENORMIN) 100 MG tablet Take 1 tablet by mouth Daily.   • atorvastatin (LIPITOR) 10 MG tablet TAKE ONE TABLET BY MOUTH DAILY   • losartan (COZAAR) 100 MG tablet TAKE ONE TABLET BY MOUTH DAILY   • tadalafil (CIALIS) 5 MG tablet Take  by mouth.   • Testosterone 20.25 MG/ACT (1.62%) gel Place  on the skin.   • VASCEPA 1 g capsule capsule  "TAKE TWO CAPSULES BY MOUTH TWICE A DAY WITH MEALS   • [DISCONTINUED] B Complex Vitamins (VITAMIN B COMPLEX PO) Take  by mouth.     No current facility-administered medications on file prior to visit.          Review of Systems   Constitution: Positive for weight gain. Negative for malaise/fatigue.   Cardiovascular: Negative for chest pain and leg swelling.   Respiratory: Negative for shortness of breath.    Neurological: Negative for light-headedness.   All other systems reviewed and are negative.      OBJECTIVE:   Vital Signs  Vitals:    05/24/19 1134   BP: 112/80   Pulse: 56   SpO2: 99%     Estimated body mass index is 27.38 kg/m² as calculated from the following:    Height as of this encounter: 175.3 cm (69\").    Weight as of this encounter: 84.1 kg (185 lb 6.4 oz).    Physical Exam   Constitutional: He is oriented to person, place, and time. Vital signs are normal. He appears well-developed and well-nourished.   Eyes: Conjunctivae are normal.   Neck: Carotid bruit is not present.   Cardiovascular: Normal rate, regular rhythm and normal heart sounds.   No murmur heard.  Pulmonary/Chest: Effort normal and breath sounds normal.   Abdominal: Normal appearance.   Musculoskeletal: Normal range of motion.   No pedal edema   Neurological: He is alert and oriented to person, place, and time. GCS eye subscore is 4. GCS verbal subscore is 5. GCS motor subscore is 6.   Skin: Skin is warm and dry.   Psychiatric: He has a normal mood and affect. His speech is normal and behavior is normal. Judgment and thought content normal. Cognition and memory are normal.         ECG 12 Lead  Date/Time: 5/24/2019 11:32 AM  Performed by: Adilene Baca APRN  Authorized by: Adilene Baca APRN   Comparison: compared with previous ECG from 5/13/2016  Rhythm: sinus bradycardia  Rate: bradycardic  BPM: 56  Conduction: conduction normal  ST Segments: ST segments normal  T Waves: T waves normal  QRS axis: normal    Clinical impression: " normal ECG            Cardiac Procedures:  1. 24-hour Holter 5/7/2016.  Frequent PVCs.  Mostly unifocal but with runs of ventricular bigeminy.  No V. tach noted.  2. Myocardial perfusion stress test 5/12/2016.  Findings consistent with normal ECG stress test.  Myocardial perfusion imaging indicates small to medium sized moderately severe area of ischemia in the apex and inferior wall.  No prior study available.  Small to moderate size and moderately reversible defect in the distal inferior wall and inferior apex.  Findings consistent with ischemia.  3. Cardiac catheterization 5/6/2016.  Left main with 0% stenosis which bifurcates into the left circumflex and LAD.  Left circumflex has 30% proximal stenosis.  First obtuse marginal branch with 30% mid stenosis.  LAD 30% proximal stenosis.  50% mid stenosis.  Distal LAD with 80% stenosis and a more distal stenosis of 90% near the apical portion.  The LAD appears diffusely diseased and severely calcified.  The vessel at the site of stenosis is about 2.0-2.5 mm with diffuse significant calcification.  RCA 30% mid stenosis.  10% distal stenosis.  Bifurcates into a large PDA and TRUDY artery.  This appears to be a right dominant system.  Based on the distal, diffuse nature of his LAD disease the small size of the vessel and severe calcification recommending medical management.        ASSESSMENT:      Diagnosis Plan   1. Hyperlipidemia, unspecified hyperlipidemia type     2. Essential hypertension     3. Coronary artery disease involving native coronary artery of native heart without angina pectoris           PLAN OF CARE:     1. Hyperlipidemia.  Patient on Lipitor and Vascepa  Lipid panel revealed total cholesterol of 236 with triglycerides of 297, .  Patient is on Lipitor 10 mg.  He has unfortunately had problems with pravastatin in the past.  At higher doses of his Vascepa he had increased fatigue.  Will continue with current doses secondary to side effects of  increasing.  2. Hypertension.  His blood pressure is very well controlled in the office today.  We will continue with Cozaar 100 mg daily, atenolol 100 mg daily.  3. Coronary artery disease.  Patient did have one episode of chest tightness yesterday that has now resolved.  He rode his bike 2 days prior 11 miles each day and did not have any exertional symptoms.  I did discuss this with Dr. Vázquez.  Patient does not have any ECG changes.  Advised patient that if he develops any exertional symptoms such as fatigue, chest pain, shortness of breath or if the chest tightness returned to please notify our office so that we could consider further evaluation.  He will continue with aspirin, Lipitor, atenolol, losartan for secondary prevention.  4. Follow-up with Dr. Vázquez in 6 months.  Sooner for problems or complications.      Coronary Artery Disease  Assessment  • The patient has no angina    Plan  • Lifestyle modifications discussed include adhering to a heart healthy diet, avoidance of tobacco products, maintenance of a healthy weight, medication compliance, regular exercise and regular monitoring of cholesterol and blood pressure    Subjective - Objective  • Current antiplatelet therapy includes aspirin 81 mg        Thank you for allowing me to participate in the care of your patient,      Sincerely,   SHIRA Durán  [facility]  05/24/19  11:44 AM    **Jean Claude Disclaimer:**  Much of this encounter note is an electronic transcription/translation of spoken language to printed text. The electronic translation of spoken language may permit erroneous, or at times, nonsensical words or phrases to be inadvertently transcribed. Although I have reviewed the note for such errors, some may still exist.

## 2019-05-28 RX ORDER — ATENOLOL 100 MG/1
TABLET ORAL
Qty: 30 TABLET | Refills: 4 | Status: SHIPPED | OUTPATIENT
Start: 2019-05-28 | End: 2019-10-24 | Stop reason: SDUPTHER

## 2019-08-28 ENCOUNTER — OFFICE VISIT (OUTPATIENT)
Dept: FAMILY MEDICINE CLINIC | Facility: CLINIC | Age: 69
End: 2019-08-28

## 2019-08-28 VITALS
TEMPERATURE: 98.6 F | DIASTOLIC BLOOD PRESSURE: 84 MMHG | SYSTOLIC BLOOD PRESSURE: 138 MMHG | RESPIRATION RATE: 18 BRPM | HEIGHT: 69 IN | OXYGEN SATURATION: 96 % | BODY MASS INDEX: 27.4 KG/M2 | HEART RATE: 48 BPM | WEIGHT: 185 LBS

## 2019-08-28 DIAGNOSIS — G47.33 OBSTRUCTIVE SLEEP APNEA SYNDROME: Primary | ICD-10-CM

## 2019-08-28 DIAGNOSIS — Z76.89 ENCOUNTER TO ESTABLISH CARE: ICD-10-CM

## 2019-08-28 DIAGNOSIS — L71.9 ROSACEA, UNSPECIFIED: ICD-10-CM

## 2019-08-28 PROBLEM — G71.02 FSH (FACIOSCAPULOHUMERAL MUSCULAR DYSTROPHY) (HCC): Status: ACTIVE | Noted: 2019-08-28

## 2019-08-28 PROCEDURE — 99213 OFFICE O/P EST LOW 20 MIN: CPT | Performed by: NURSE PRACTITIONER

## 2019-08-28 PROCEDURE — G0438 PPPS, INITIAL VISIT: HCPCS | Performed by: NURSE PRACTITIONER

## 2019-08-28 NOTE — PROGRESS NOTES
QUICK REFERENCE INFORMATION:  The ABCs of the Annual Wellness Visit    Subsequent Medicare Wellness Visit     HEALTH RISK ASSESSMENT    : 1950    Recent Hospitalizations:  No hospitalization(s) within the last year..  ccc      Current Medical Providers:  Patient Care Team:  Hallie Crockett APRN as PCP - General (Nurse Practitioner)  Jose J Griggs MD as PCP - Family Medicine  Ronal Bedoya MD as PCP - Claims Attributed        Smoking Status:  Social History     Tobacco Use   Smoking Status Never Smoker   Smokeless Tobacco Never Used   Tobacco Comment    caffeine use-coffee 2 cups daily       Alcohol Consumption:  Social History     Substance and Sexual Activity   Alcohol Use Yes    Comment: 3 beers on weekends       Depression Screen:   No flowsheet data found.    Health Habits and Functional and Cognitive Screening:  Functional & Cognitive Status 2019   Do you have difficulty preparing food and eating? No   Do you have difficulty bathing yourself, getting dressed or grooming yourself? No   Do you have difficulty using the toilet? No   Do you have difficulty moving around from place to place? No   Do you have trouble with steps or getting out of a bed or a chair? No   Current Diet Well Balanced Diet   Dental Exam Up to date   Eye Exam Up to date   Exercise (times per week) 3 times per week   Current Exercise Activities Include Bicycling Outdoors   Do you need help using the phone?  No   Are you deaf or do you have serious difficulty hearing?  No   Do you need help with transportation? No   Do you need help shopping? No   Do you need help preparing meals?  No   Do you need help with housework?  No   Do you need help with laundry? No   Do you need help taking your medications? No   Do you need help managing money? No   Do you ever drive or ride in a car without wearing a seat belt? No   Have you felt unusual stress, anger or loneliness in the last month? No   Who do you live with? Spouse    If you need help, do you have trouble finding someone available to you? No   Have you been bothered in the last four weeks by sexual problems? No   Do you have difficulty concentrating, remembering or making decisions? No           Does the patient have evidence of cognitive impairment? No    Asiprin use counseling: Taking ASA appropriately as indicated      Recent Lab Results:    Lab Results   Component Value Date     (H) 05/23/2018        Lab Results   Component Value Date    CHOL 236 (H) 04/30/2019    TRIG 297 (H) 04/30/2019    HDL 43 04/30/2019    VLDL 59.4 (H) 04/30/2019    LDLHDL 3.11 04/30/2019           Age-appropriate Screening Schedule:  Refer to the list below for future screening recommendations based on patient's age, sex and/or medical conditions. Orders for these recommended tests are listed in the plan section. The patient has been provided with a written plan.    Health Maintenance   Topic Date Due   • INFLUENZA VACCINE  09/22/2019 (Originally 8/1/2019)   • PNEUMOCOCCAL VACCINES (65+ LOW/MEDIUM RISK) (1 of 2 - PCV13) 08/27/2021 (Originally 8/25/2013)   • LIPID PANEL  04/30/2020   • COLONOSCOPY  11/02/2025   • TDAP/TD VACCINES  Discontinued   • ZOSTER VACCINE  Discontinued        Subjective   History of Present Illness    Jesus Islas is a 69 y.o. male who presents for an Annual Wellness Visit.    The following portions of the patient's history were reviewed and updated as appropriate: allergies, current medications, past family history, past medical history, past social history, past surgical history and problem list.    Outpatient Medications Prior to Visit   Medication Sig Dispense Refill   • ASPIRIN LOW DOSE 81 MG EC tablet TAKE ONE TABLET BY MOUTH DAILY 30 tablet 6   • atenolol (TENORMIN) 100 MG tablet TAKE ONE TABLET BY MOUTH DAILY 30 tablet 4   • atorvastatin (LIPITOR) 10 MG tablet TAKE ONE TABLET BY MOUTH DAILY 90 tablet 10   • losartan (COZAAR) 100 MG tablet TAKE ONE TABLET BY MOUTH  "DAILY 90 tablet 4   • tadalafil (CIALIS) 5 MG tablet Take  by mouth.     • Testosterone 20.25 MG/ACT (1.62%) gel Place  on the skin.     • VASCEPA 1 g capsule capsule TAKE TWO CAPSULES BY MOUTH TWICE A DAY WITH MEALS 120 capsule 10     No facility-administered medications prior to visit.        Patient Active Problem List   Diagnosis   • Benign prostatic hyperplasia with urinary obstruction   • Diverticulosis of large intestine   • Hyperlipidemia   • Hypertension   • Hypogonadism in male   • Obstructive sleep apnea syndrome   • Muscular dystrophy   • Acute coronary syndrome (CMS/[facility])       Advance Care Planning:  Patient does not have an advance directive - information provided to the patient today    Identification of Risk Factors:  Risk factors include: Colon Cancer Screening.  He is UTD and is due for next mid next yr which we reviewed.     Review of Systems    Compared to one year ago, the patient feels his physical health is worse.  Compared to one year ago, the patient feels his mental health is the same.    Objective     Physical Exam    Vitals:    08/28/19 1027   BP: 138/84   Pulse: (!) 48   Resp: 18   Temp: 98.6 °F (37 °C)   SpO2: 96%   Weight: 83.9 kg (185 lb)   Height: 175.3 cm (69\")       Patient's Body mass index is 27.32 kg/m². BMI is within normal parameters. No follow-up required..      Assessment/Plan   Patient Self-Management and Personalized Health Advice  The patient has been provided with information about: designing advance directives and preventive services including:   · Annual Wellness Visit (AWV).    Visit Diagnoses:    ICD-10-CM ICD-9-CM   1. Sleep apnea in adult G47.30 327.23   2. Obstructive sleep apnea syndrome G47.33 327.23   3. Rosacea, unspecified L71.9 695.3       Orders Placed This Encounter   Procedures   • Ambulatory Referral to Sleep Medicine     Referral Priority:   Routine     Referral Type:   Consultation     Referral Reason:   Specialty Services Required     Requested " Specialty:   Sleep Medicine     Number of Visits Requested:   1   • Ambulatory Referral to Dermatology     Referral Priority:   Routine     Referral Type:   Consultation     Referral Reason:   Specialty Services Required     Requested Specialty:   Dermatology     Number of Visits Requested:   1       Outpatient Encounter Medications as of 8/28/2019   Medication Sig Dispense Refill   • ASPIRIN LOW DOSE 81 MG EC tablet TAKE ONE TABLET BY MOUTH DAILY 30 tablet 6   • atenolol (TENORMIN) 100 MG tablet TAKE ONE TABLET BY MOUTH DAILY 30 tablet 4   • atorvastatin (LIPITOR) 10 MG tablet TAKE ONE TABLET BY MOUTH DAILY 90 tablet 10   • losartan (COZAAR) 100 MG tablet TAKE ONE TABLET BY MOUTH DAILY 90 tablet 4   • tadalafil (CIALIS) 5 MG tablet Take  by mouth.     • Testosterone 20.25 MG/ACT (1.62%) gel Place  on the skin.     • VASCEPA 1 g capsule capsule TAKE TWO CAPSULES BY MOUTH TWICE A DAY WITH MEALS 120 capsule 10     No facility-administered encounter medications on file as of 8/28/2019.        Reviewed use of high risk medication in the elderly: yes  Reviewed for potential of harmful drug interactions in the elderly: yes    Follow Up:  No Follow-up on file.     An After Visit Summary and PPPS with all of these plans were given to the patient.        We discussed healthy diet and ways to incorporate activity and exercise into daily life.  Recommendations include trying to get at least 150 mins. of moderate intensity aerobic activity that provide enjoyment to lower risks of CVD disease, decrease depression and improve sleep.                EMR Dragon/Transcription disclaimer:   Much of this encounter note is an electronic transcription/translation of spoken language to printed text. The electronic translation of spoken language may permit erroneous, or at times, nonsensical words or phrases to be inadvertently transcribed; Although I have reviewed the note for such errors, some may still exist.        Advance  Directive    Advance directives are legal documents that let you make choices ahead of time about your health care and medical treatment in case you become unable to communicate for yourself. Advance directives are a way for you to communicate your wishes to family, friends, and health care providers. This can help convey your decisions about end-of-life care if you become unable to communicate.  Discussing and writing advance directives should happen over time rather than all at once. Advance directives can be changed depending on your situation and what you want, even after you have signed the advance directives.  If you do not have an advance directive, some states assign family decision makers to act on your behalf based on how closely you are related to them. Each state has its own laws regarding advance directives. You may want to check with your health care provider, , or state representative about the laws in your state. There are different types of advance directives, such as:  · Medical power of .  · Living will.  · Do not resuscitate (DNR) or do not attempt resuscitation (DNAR) order.  Health care proxy and medical power of   A health care proxy, also called a health care agent, is a person who is appointed to make medical decisions for you in cases in which you are unable to make the decisions yourself. Generally, people choose someone they know well and trust to represent their preferences. Make sure to ask this person for an agreement to act as your proxy. A proxy may have to exercise judgment in the event of a medical decision for which your wishes are not known.  A medical power of  is a legal document that names your health care proxy. Depending on the laws in your state, after the document is written, it may also need to be:  · Signed.  · Notarized.  · Dated.  · Copied.  · Witnessed.  · Incorporated into your medical record.  You may also want to appoint someone to  manage your financial affairs in a situation in which you are unable to do so. This is called a durable power of  for finances. It is a separate legal document from the durable power of  for health care. You may choose the same person or someone different from your health care proxy to act as your agent in financial matters.  If you do not appoint a proxy, or if there is a concern that the proxy is not acting in your best interests, a court-appointed guardian may be designated to act on your behalf.  Living will  A living will is a set of instructions documenting your wishes about medical care when you cannot express them yourself. Health care providers should keep a copy of your living will in your medical record. You may want to give a copy to family members or friends. To alert caregivers in case of an emergency, you can place a card in your wallet to let them know that you have a living will and where they can find it. A living will is used if you become:  · Terminally ill.  · Incapacitated.  · Unable to communicate or make decisions.  Items to consider in your living will include:  · The use or non-use of life-sustaining equipment, such as dialysis machines and breathing machines (ventilators).  · A DNR or DNAR order, which is the instruction not to use cardiopulmonary resuscitation (CPR) if breathing or heartbeat stops.  · The use or non-use of tube feeding.  · Withholding of food and fluids.  · Comfort (palliative) care when the goal becomes comfort rather than a cure.  · Organ and tissue donation.  A living will does not give instructions for distributing your money and property if you should pass away. It is recommended that you seek the advice of a  when writing a will. Decisions about taxes, beneficiaries, and asset distribution will be legally binding. This process can relieve your family and friends of any concerns surrounding disputes or questions that may come up about the  distribution of your assets.  DNR or DNAR  A DNR or DNAR order is a request not to have CPR in the event that your heart stops beating or you stop breathing. If a DNR or DNAR order has not been made and shared, a health care provider will try to help any patient whose heart has stopped or who has stopped breathing. If you plan to have surgery, talk with your health care provider about how your DNR or DNAR order will be followed if problems occur.  Summary  · Advance directives are the legal documents that allow you to make choices ahead of time about your health care and medical treatment in case you become unable to communicate for yourself.  · The process of discussing and writing advance directives should happen over time. You can change the advance directives, even after you have signed them.  · Advance directives include DNR or DNAR orders, living motta, and designating an agent as your medical power of .  This information is not intended to replace advice given to you by your health care provider. Make sure you discuss any questions you have with your health care provider.  Document Released: 03/26/2009 Document Revised: 11/06/2017 Document Reviewed: 11/06/2017  [employer] Interactive Patient Education © 2019 [employer]

## 2019-08-28 NOTE — PROGRESS NOTES
Subjective   Jesus Islas is a 69 y.o. male.     Chief Complaint   Patient presents with   • Annual Exam   • Establish Care   • Hyperlipidemia   • Hypertension      HPI patient is new to me.  He is here to establish care for rash on his face and sleep apnea.    He has a past medical history of hypertension, coronary artery disease, hyperlipidemia.  He is taking and tolerating his statin and blood pressure medications.  He also reports he had some episodes of heart palpitations which are in non-A. fib and are resolved with atenolol.  He reports his blood pressure is usually normal at home and his heart rate usually runs in the low 50s.  He denies any syncope or dizziness.  He follows with cardiology twice per year.    He has a history of fascialscapulohumeral muscular dystrophy.  He is followed by neurology in [place].  He has difficulty moving his left arm and seems to affect his chest and lower jaw and abdomen.  He rides a bike 11 miles per day.  He is now retired but is otherwise active.    He was diagnosed with sleep apnea about 15 years ago and has been using the same CPAP.  This is no longer covered by his insurance and he needs a new CPAP.  He does not think he needs a new evaluation as his sleep apnea is well controlled.  He does admit he has never been reevaluated and never had his settings evaluated for efficacy.    He is followed by urology for low testosterone and is receiving testosterone through urology.  He also has been noted to have a high PSA which is thought to be due to that testosterone.  He had negative prostate biopsy.  He notes prior to starting on testosterone he was having severe depression and fatigue which resolved once his testosterone was started.    He is .  His wife works part-time.  He does report sometimes he does not feel useful since he is retired and his wife is a workaholic.  He ends up spending quite a bit of time by himself however he does meet with friends regularly to  "go out to dinner and socialize.    Social History     Tobacco Use   • Smoking status: Never Smoker   • Smokeless tobacco: Never Used   • Tobacco comment: caffeine use-coffee 2 cups daily   Substance Use Topics   • Alcohol use: Yes     Comment: 3 beers on weekends   • Drug use: No       The following portions of the patient's history were reviewed and updated as appropriate: allergies, current medications, past family history, past medical history, past social history, past surgical history and problem list.    Review of Systems   Constitutional: Negative for activity change, appetite change and fatigue.   HENT: Positive for hearing loss (wears b/l hearing aids). Negative for congestion, ear pain, rhinorrhea, sore throat and trouble swallowing.    Eyes: Positive for visual disturbance (wears corrective lenses, but has noticed recent decreasing near vision). Negative for pain.   Respiratory: Negative for cough and shortness of breath.    Cardiovascular: Negative for chest pain and palpitations.   Gastrointestinal: Negative for abdominal pain, blood in stool, constipation, diarrhea, nausea and vomiting.   Genitourinary: Positive for urgency. Negative for dysuria, flank pain, frequency and hematuria.   Musculoskeletal: Negative for back pain, gait problem and myalgias.        Left upper arm weakness secondary to MD   Skin: Positive for rash.        Recently notes rash across nose and b/l cheeks which is mildly scaly, not itchy and worsens with sun exposure.  Previously disappeared after awhile but now is mostly present  Has many \"old age spots\" he thinks need to be evaluated for cancer   Neurological: Positive for weakness. Negative for dizziness, tremors, speech difficulty and light-headedness.   Psychiatric/Behavioral: Negative for dysphoric mood and sleep disturbance. The patient is not nervous/anxious.         Describes himself as curmudgeon       Objective   Blood pressure 138/84, pulse (!) 48, temperature 98.6 °F " "(37 °C), resp. rate 18, height 175.3 cm (69\"), weight 83.9 kg (185 lb), SpO2 96 %.    Physical Exam   Constitutional: He is oriented to person, place, and time. He appears well-developed and well-nourished. No distress.   HENT:   Head: Normocephalic and atraumatic.   Right Ear: Tympanic membrane, external ear and ear canal normal.   Left Ear: Tympanic membrane, external ear and ear canal normal.   Mouth/Throat: Uvula is midline and oropharynx is clear and moist.   Eyes: Conjunctivae and EOM are normal. Pupils are equal, round, and reactive to light. Right eye exhibits no discharge. Left eye exhibits no discharge.   Neck: Neck supple. No thyromegaly present.   Cardiovascular: Normal rate, regular rhythm, normal heart sounds and intact distal pulses.   No murmur heard.  Pulmonary/Chest: Effort normal and breath sounds normal.   Abdominal: Soft. Bowel sounds are normal. There is no hepatosplenomegaly. There is no tenderness.   Protuberant abdomen, soft, non tender to palp   Musculoskeletal: He exhibits no deformity.   Gait smooth and steady  Left  strength 3/4, right 4/5  Difficulty raising left arm unless he internally rotates shoulder and sometimes uses right arm to assist.    B/l pectoral muscles somewhat atrophic   Lymphadenopathy:     He has no cervical adenopathy.   Neurological: He is alert and oriented to person, place, and time. No cranial nerve deficit.   Skin: Skin is warm and dry.   Mildly scaly, erythematous rash across bridge of nose and cheeks-appears to be rosacea    Scattered raised, white scaly, actinic lesions over b/l upper arms and neck   Psychiatric: He has a normal mood and affect.   Mildly irritable-prefers to see specialists and to be in control of decision making for medical care   Nursing note and vitals reviewed.      Assessment   Problem List Items Addressed This Visit        Respiratory    Obstructive sleep apnea syndrome      Other Visit Diagnoses     Sleep apnea in adult    -  " Primary    Relevant Orders    Ambulatory Referral to Sleep Medicine    Rosacea, unspecified        Relevant Orders    Ambulatory Referral to Dermatology           Procedures PHQ-9 Total Score: 1   DRAKE 7 Total Score: 0               Impression and Plan:  Referral to derm for suspected rosacea and eval of skin lesions.  Discussed using sun screen.  Patient prefers to start any medication until he is seen by dermatology.     Referral to sleep medicine. After discussion he is agreeable to see sleep medicine for eval of efficacy of CPAP and eval of sleep apnea since he has not been seen in appr. 15 yrs.  He initially just wanted an Rx for a new CPAP.      Declined labs today. Reports he has everything done lab wise at neurology and cardiology.  I do not see a copy of his labs, but he will bring them in to add to chart.      Reviewed last neurology, cardiology, urology notes.  He has not been seen by PCP since 2016. Does not feel need to come in for regular check ups and we discussed the importance of health  maintenance.      He had last colonoscopy just under 5 yrs ago and will need another in about 6 months.  We reviewed the importance of this due to his brother's death due to colon cancer.      There are no preventive care reminders to display for this patient.           EMR Dragon/Transcription disclaimer:   Much of this encounter note is an electronic transcription/translation of spoken language to printed text. The electronic translation of spoken language may permit erroneous, or at times, nonsensical words or phrases to be inadvertently transcribed; Although I have reviewed the note for such errors, some may still exist.

## 2019-09-11 ENCOUNTER — OFFICE VISIT (OUTPATIENT)
Dept: SLEEP MEDICINE | Facility: HOSPITAL | Age: 69
End: 2019-09-11

## 2019-09-11 VITALS
DIASTOLIC BLOOD PRESSURE: 96 MMHG | SYSTOLIC BLOOD PRESSURE: 146 MMHG | BODY MASS INDEX: 26.05 KG/M2 | HEIGHT: 70 IN | HEART RATE: 57 BPM | WEIGHT: 182 LBS | OXYGEN SATURATION: 98 %

## 2019-09-11 DIAGNOSIS — G47.33 OBSTRUCTIVE SLEEP APNEA SYNDROME: ICD-10-CM

## 2019-09-11 PROCEDURE — G0463 HOSPITAL OUTPT CLINIC VISIT: HCPCS

## 2019-09-11 NOTE — PROGRESS NOTES
[facility]  Telephone: [phone] / Fax: [phone] [place]  Telephone: [phone] / Fax: [phone] Roseline Diez    Referring Physician: Hallie Crockett APRN  PCP: Hallie Crockett APRN    Reason for consult:  sleep apnea    Jesus Islas is a 69 y.o.male  was seen in the Sleep Disorders Center today for evaluation of sleep apnea. He was diagnosed with severe SANDRA in 2007 by Dr. Rey and has been on the machine ever since. He is here today to obtain order for new machine and new supplies. He has been replacing supplies from [email or web address]. He uses BIPAP device 12/7. Machine started malfunctioning and he would like to update it. His mask leaks and It wakes him up. He feels that current pressures may be excessive. He feels sleepy during the day on occasion. He does not use the humidifier with the BIPAP and would like to get the new machine without the humidifier if possible.  His sleep schedule is 1am-9am.Without the machine he snores, wakes up gasping for breath and has witnessed apneas. His ESS is 5.    SH- retired , drinks 4-5 beers per day, 2 coffee/diet soda per day.    ROS- +history of irregular heart rate x 1. Rest is negative.    Jesus Islas  has a past medical history of Arthritis, BPH (benign prostatic hyperplasia), CAD (coronary artery disease), Coronary artery disease, Diverticulosis of colon, Dyslipidemia, Elevated troponin, FSH (facioscapulohumeral muscular dystrophy), Hearing aid worn, Hypertension, Hypogonadism male, SANDRA on CPAP, and PVC (premature ventricular contraction).    Current Medications:    Current Outpatient Medications:   •  ASPIRIN LOW DOSE 81 MG EC tablet, TAKE ONE TABLET BY MOUTH DAILY, Disp: 30 tablet, Rfl: 6  •  atenolol (TENORMIN) 100 MG tablet, TAKE ONE TABLET BY MOUTH DAILY, Disp: 30 tablet, Rfl: 4  •  atorvastatin (LIPITOR) 10 MG tablet, TAKE ONE TABLET BY MOUTH DAILY, Disp: 90 tablet, Rfl: 10  •  losartan (COZAAR) 100 MG tablet, TAKE  "ONE TABLET BY MOUTH DAILY, Disp: 90 tablet, Rfl: 4  •  tadalafil (CIALIS) 5 MG tablet, Take  by mouth., Disp: , Rfl:   •  Testosterone 20.25 MG/ACT (1.62%) gel, Place  on the skin., Disp: , Rfl:   •  VASCEPA 1 g capsule capsule, TAKE TWO CAPSULES BY MOUTH TWICE A DAY WITH MEALS, Disp: 120 capsule, Rfl: 10    I have reviewed Past Medical History, Past Surgical History, Medication List, Social History and Family History as entered in Sleep Questionnaire and EPIC.    ESS     Vital Signs /96   Pulse 57   Ht 176.5 cm (69.5\")   Wt 82.6 kg (182 lb)   SpO2 98%   BMI 26.49 kg/m²  Body mass index is 26.49 kg/m².    General Alert and oriented. No acute distress noted   Pharynx/Throat Class IV Mallampati airway, large tongue, no evidence of redundant lateral pharyngeal tissue. No oral lesions. No thrush. Moist mucous membranes.   Head Normocephalic. Symmetrical. Atraumatic.    Nose No septal deviation. No drainage   Chest Wall Normal shape. Symmetric expansion with respiration. No tenderness.   Neck Trachea midline, no thyromegaly or adenopathy    Lungs Clear to auscultation bilaterally. No wheezes. No rhonchi. No rales. Respirations regular, even and unlabored.   Heart Regular rhythm and normal rate. Normal S1 and S2. No murmur   Abdomen Soft, non-tender and non-distended. Normal bowel sounds. No masses.   Extremities Moves all extremities well. No edema   Psychiatric Normal mood and affect.     Testing  PSG 2007-severe SANDRA with AHI 37-treated with BIPAP 12/7.    Download dates 8/12/19-9/10/19- 100% use with average nightly use of 7 hours and 22 min on BIPAP 12/7, AHI 2.2     Impression:  1. Obstructive sleep apnea syndrome          Plan:  He requires reevaluation for SANDRA since he changed to medicare. I ordered in lab split night study to requalify him for a new machine.  I discussed the pathophysiology of obstructive sleep apnea with the patient.  We discussed the adverse outcomes associated with untreated " sleep-disordered breathing.  We discussed treatment modalities of obstructive sleep apnea including CPAP device as well as oral mandibular advancement device. Sleep study will be scheduled to establish definitive diagnosis of sleep disorder breathing.  Weight loss will be strongly beneficial in order to reduce the severity of sleep-disordered breathing.  Patient has narrow oropharyngeal structure.  Caution during activities that require prolonged concentration is strongly advised.  Patient will be notified of sleep study results after sleep study is completed.  If sleep apnea is only mild,  oral mandibular advancement device may be one of the treatment options.  However if sleep apnea is moderately severe, CPAP treatment will be strongly encouraged.  The patient is not opposed to treatment with CPAP device if we confirm significant obstructive sleep apnea on polysomnography. He does not feel that he needs a sleep aid for the study.      Thank you for allowing me to participate in your patient's care.    The patient will follow up with Dr. Rey after completion of polysomnography.    SHIRA Go  [facility]  Phone: [phone]      Part of this note may be an electronic transcription/translation of spoken language to printed text using the Dragon Dictation System. Some errors may exist even though the document was edited.

## 2019-10-24 RX ORDER — ATENOLOL 100 MG/1
TABLET ORAL
Qty: 30 TABLET | Refills: 6 | Status: SHIPPED | OUTPATIENT
Start: 2019-10-24 | End: 2021-02-19

## 2019-11-18 ENCOUNTER — HOSPITAL ENCOUNTER (OUTPATIENT)
Dept: SLEEP MEDICINE | Facility: HOSPITAL | Age: 69
Discharge: HOME | End: 2019-11-18
Admitting: NURSE PRACTITIONER

## 2019-11-18 DIAGNOSIS — G47.33 OBSTRUCTIVE SLEEP APNEA SYNDROME: ICD-10-CM

## 2019-11-18 PROCEDURE — 95811 POLYSOM 6/>YRS CPAP 4/> PARM: CPT

## 2020-02-07 ENCOUNTER — OFFICE VISIT (OUTPATIENT)
Dept: SLEEP MEDICINE | Facility: HOSPITAL | Age: 70
End: 2020-02-07

## 2020-02-07 VITALS
SYSTOLIC BLOOD PRESSURE: 164 MMHG | DIASTOLIC BLOOD PRESSURE: 97 MMHG | OXYGEN SATURATION: 97 % | HEIGHT: 70 IN | BODY MASS INDEX: 27 KG/M2 | WEIGHT: 188.6 LBS | HEART RATE: 66 BPM

## 2020-02-07 DIAGNOSIS — E66.3 OVERWEIGHT (BMI 25.0-29.9): ICD-10-CM

## 2020-02-07 DIAGNOSIS — G47.33 OBSTRUCTIVE SLEEP APNEA: Primary | ICD-10-CM

## 2020-02-07 PROCEDURE — G0463 HOSPITAL OUTPT CLINIC VISIT: HCPCS

## 2020-02-07 NOTE — PROGRESS NOTES
[facility]  [address] [facility]  3RD FLOOR  [facility] [address]  [phone]    PCP: Hallie Crockett APRN    Reason for visit:  Sleep disorders: SANDRA    Jesus is a 69 y.o.male who was seen in the Sleep Disorders Center today. He got an updated machine after another study and is here to review. He is using [employer] nasal mask and likes it very much. He sleeps from 11pm to 8am. No air leaks or dry mouth. He wakes up rested and no EDS.  [place] Sleepiness Scale is 2. Caffeine 1 per day. Alcohol 2 per week.    Jesus  reports that he has never smoked. He has never used smokeless tobacco.    Pertinent Positive Review of Systems of denies  Rest of Review of Systems was negative as recorded in Sleep Questionnaire.    Patient  has a past medical history of Arthritis, BPH (benign prostatic hyperplasia), CAD (coronary artery disease), Coronary artery disease, Diverticulosis of colon, Dyslipidemia, Elevated troponin, FSH (facioscapulohumeral muscular dystrophy) (CMS/HCC), Hearing aid worn, Hypertension, Hypogonadism male, SANDRA on CPAP, and PVC (premature ventricular contraction).     Current Medications:    Current Outpatient Medications:   •  ASPIRIN LOW DOSE 81 MG EC tablet, TAKE ONE TABLET BY MOUTH DAILY, Disp: 30 tablet, Rfl: 6  •  atenolol (TENORMIN) 100 MG tablet, TAKE ONE TABLET BY MOUTH DAILY, Disp: 30 tablet, Rfl: 6  •  atorvastatin (LIPITOR) 10 MG tablet, TAKE ONE TABLET BY MOUTH DAILY, Disp: 90 tablet, Rfl: 10  •  losartan (COZAAR) 100 MG tablet, TAKE ONE TABLET BY MOUTH DAILY, Disp: 90 tablet, Rfl: 4  •  tadalafil (CIALIS) 5 MG tablet, Take  by mouth., Disp: , Rfl:   •  Testosterone 20.25 MG/ACT (1.62%) gel, Place  on the skin., Disp: , Rfl:   •  VASCEPA 1 g capsule capsule, TAKE TWO CAPSULES BY MOUTH TWICE A DAY WITH MEALS, Disp: 120 capsule, Rfl: 10   also entered in Sleep Questionnaire         Vital Signs: /97 (BP Location: Left arm, Patient Position: Sitting)   Pulse 66   Ht  "176.5 cm (69.5\")   Wt 85.5 kg (188 lb 9.6 oz)   SpO2 97%   BMI 27.45 kg/m²     Body mass index is 27.45 kg/m².       Tongue: large      Dentition: good       Pharynx: Posterior pharyngeal pillars are wide   Mallampatti: III (soft and hard palate and base of uvula visible)        General: Alert. Cooperative. Well developed. No acute distress.             Head:  Normocephalic. Symmetrical. Atraumatic.              Nose: No septal deviation. No drainage.          Throat: No oral lesions. No thrush. Moist mucous membranes.    Chest Wall:  Normal shape. Symmetric expansion with respiration. No tenderness.             Neck:  Trachea midline.           Lungs:  Clear to auscultation bilaterally. No wheezes. No rhonchi. No rales. Respirations regular, even and unlabored.            Heart:  Regular rhythm and normal rate. Normal S1 and S2. No murmur.     Abdomen:  Soft, non-tender and non-distended. Normal bowel sounds. No masses.  Extremities:  Moves all extremities well. No edema.    Psychiatric: Normal mood and affect.    Study:  · PSG 2007-severe SANDRA with AHI 37-treated with BIPAP 12/7.  · 11/18/19  Overnight split polysomnogram study.  Diagnostic study from 11:14 PM to 1:11 AM.  Sleep efficiency very poor at 52% with absence of slow-wave sleep and REM sleep.  AHI index 6.  Patient slept supine for 60 minutes.  There was absence of REM sleep.  Oxygen saturation remained above 88%.  Arousal index 35.  PLM index 15 with PLM arousal index 4.  Patient had 17% time snoring.  Titration study from 1:11 AM to 5:21 AM.  Sleep efficiency 87.5% with 3.64 hours of total sleep time.  Sleep distribution shows rebound and slow-wave sleep to 32% and an increase in rem sleep to 16.7%.  AHI index is improved.  Adequate supine sleep at 218 minutes.  Patient at 36 minutes REM sleep.  Oxygen saturation remained above 88%.  Snoring not recorded.  CPAP increased from 5 to 11 cm water pressure.  Maximum sleep at 9 cm water pressure.  Maximum " REM sleep at same pressure.  Supine and REM sleep was achieved at 9 cm water pressure.  Air fit F 20 fullface mask was used.    Testing:  · Compliance and set up is good at 8-1/2 hours.  AHI 0.2.  Average pressure 10.3.  Auto CPAP between 8 and 12 cm.    DME Company: [employer]    Impression:  1. Obstructive sleep apnea    2. Overweight (BMI 25.0-29.9)        Plan:  Jesus is compliant and benefits from use of the CPAP.  This is an updated machine for him.  He has been using the device for a long time.  He likes the [employer] nasal masks.  He was reminded to change his supplies regularly.    I reiterated the importance of effective treatment of obstructive sleep apnea with PAP machine.  Cardiovascular health risks of untreated sleep apnea were again reviewed.  Patient was asked to remain cautious if there is persistent hypersomnolence. The benefit of weight loss in reducing severity of obstructive sleep apnea was discussed.  Patient would benefit from adhering to a strict diet to achieve ideal BMI.     Change of PAP supplies regularly is important for effective use.  Change of cushion on the mask or plugs on nasal pillows along with disposable filters once every month and change of mask frame, tubing, headgear and Velcro straps every 6 months at the minimum was reiterated.    This patient is compliant with PAP machine and benefits from its use.  Apnea hypopneas index is corrected/improved.  Daytime hypersomnolence has resolved.     Patient will follow up in this clinic in 1 year APRBENJI    Thank you for allowing me to participate in your patient's care.    Quoc Rey MD    Part of this note may be an electronic transcription/translation of spoken language to printed text using the Dragon Dictation System.

## 2020-02-10 ENCOUNTER — OFFICE VISIT (OUTPATIENT)
Dept: CARDIOLOGY | Facility: CLINIC | Age: 70
End: 2020-02-10

## 2020-02-10 VITALS
OXYGEN SATURATION: 95 % | HEART RATE: 70 BPM | DIASTOLIC BLOOD PRESSURE: 80 MMHG | WEIGHT: 190.6 LBS | BODY MASS INDEX: 28.23 KG/M2 | SYSTOLIC BLOOD PRESSURE: 128 MMHG | HEIGHT: 69 IN

## 2020-02-10 DIAGNOSIS — I25.10 CORONARY ARTERY DISEASE INVOLVING NATIVE CORONARY ARTERY OF NATIVE HEART WITHOUT ANGINA PECTORIS: Primary | ICD-10-CM

## 2020-02-10 DIAGNOSIS — E78.5 DYSLIPIDEMIA: ICD-10-CM

## 2020-02-10 DIAGNOSIS — E78.1 HYPERTRIGLYCERIDEMIA: ICD-10-CM

## 2020-02-10 DIAGNOSIS — I49.3 PVC'S (PREMATURE VENTRICULAR CONTRACTIONS): ICD-10-CM

## 2020-02-10 DIAGNOSIS — R77.8 ELEVATED TROPONIN LEVEL NOT DUE MYOCARDIAL INFARCTION: ICD-10-CM

## 2020-02-10 PROCEDURE — 99213 OFFICE O/P EST LOW 20 MIN: CPT | Performed by: INTERNAL MEDICINE

## 2020-03-02 RX ORDER — ATORVASTATIN CALCIUM 10 MG/1
TABLET, FILM COATED ORAL
Qty: 90 TABLET | Refills: 1 | Status: SHIPPED | OUTPATIENT
Start: 2020-03-02 | End: 2020-10-06

## 2020-03-15 NOTE — PROGRESS NOTES
"Date of Office Visit:  2/10/2020  Encounter Provider: Yuan Vázquez MD  Place of Service: [facility] CARDIOLOGY  Patient Name: Jesus Islas  :1950    Chief complaint: Follow-up coronary artery disease, PVCs, chronically elevated   troponin, dyslipidemia, and hypertriglyceridemia.    History of Present Illness:      I again had the pleasure of seeing your patient in cardiology office on 2/10/2020. As you   well know, he is a very pleasant 69 year-old white male with a past medical history   significant for FSH muscular dystrophy, frequent PVC's, coronary artery disease, and   dyslipidemia who presents for follow-up.       The patient was admitted on 2016 with palpitations for several days prior to the   admission. He had stated that his heart was \"skipping beats\". In the emergency   department, he was noted to have multiple PVCs, including patterns of bigeminy at   times. He had also had chest tightness and pressure 2 weeks prior to presentation,   and his troponin was elevated at 0.092 (cutoff for MI at 0.90). His troponin then came   back at 0.059 and again at 0.057 on the next 2 tests. He did wear a Holter monitor during   his hospital stay which showed an average of 110 PVCs per hour which were unifocal in   nature. He did have frequent bigeminal episodes, but no ventricular tachycardia. His TSH   was checked and was normal, and he was continued on his home dose of atenolol. He   then had a Lexiscan Cardiolite stress test performed on 2016 which showed   ischemia at the distal inferior wall and apex. A subsequent left heart catheterization was   performed on 2016 by Dr. August which showed significant disease in the LAD.   Specifically, he had 30% proximal disease, 50% mid disease, and 80-90% disease in the   apical portion of the LAD. However, the LAD was felt to be too small to intervene in the   apical area, and medical therapy was recommended. His " ejection fraction on the   ventriculogram was 55%. He was started on a very low dose of pravastatin at 10 mg per  day given his muscular dystrophy and a history of myalgias with statins in the past after   being diagnosed with this. He also has a history of significant dyslipidemia, including   grossly elevated triglycerides (greater than 1100 in the past), elevated LDL, and a low   HDL. It was later determined that he does have a chronically elevated troponin.  He   did develop memory impairment with pravastatin, but has tolerated low-dose Lipitor.      The patient presents today for follow-up.  He has had no chest pain.  He states that he   feels his PVCs when he is more anxious, although they typically only last for 5 to 10   minutes at a time.  He is biking 20 miles every other day without difficulty.    Past Medical History:   Diagnosis Date   • Arthritis    • BPH (benign prostatic hyperplasia)    • CAD (coronary artery disease)    • Coronary artery disease     Cath 5/6/16: LAD 30% prox, 50% mid, 80-90% apical (small portion of vessel).  LCx and RCA both with up to 30% disease.  Medical management recommended at that time.   • Diverticulosis of colon    • Dyslipidemia     Including severe hypertriglyceridemia.  Also with elevated LDL and low HDL.   • Elevated troponin     Chronically elevated troponin (likely from muscular dystrophy)   • FSH (facioscapulohumeral muscular dystrophy) (CMS/HCC)    • Hearing aid worn     Bilateral    • Hypertension    • Hypogonadism male    • SANDRA on CPAP    • PVC (premature ventricular contraction)     Frequent        Past Surgical History:   Procedure Laterality Date   • CARDIAC CATHETERIZATION N/A 5/6/2016    Procedure: Coronary angiography;  Surgeon: Ly August MD;  Location: [facility] INVASIVE LOCATION;  Service:    • CARDIAC CATHETERIZATION N/A 5/6/2016    Procedure: Left ventriculography;  Surgeon: Ly August MD;  Location: [facility] INVASIVE LOCATION;  Service:     • CARDIAC CATHETERIZATION N/A 5/6/2016    Procedure: Left Heart Cath;  Surgeon: Ly August MD;  Location:  [facility];  Service:    • HERNIA REPAIR      Bilateral inguinal hernia repair and umbilical hernia repair in past    • DE RT/LT HEART CATHETERS N/A 5/6/2016    Procedure: Percutaneous Coronary Intervention;  Surgeon: Ly August MD;  Location:  [facility] INV[facility] LOCATION;  Service: Cardiovascular       Current Outpatient Medications on File Prior to Visit   Medication Sig Dispense Refill   • ASPIRIN LOW DOSE 81 MG EC tablet TAKE ONE TABLET BY MOUTH DAILY 30 tablet 6   • atenolol (TENORMIN) 100 MG tablet TAKE ONE TABLET BY MOUTH DAILY 30 tablet 6   • losartan (COZAAR) 100 MG tablet TAKE ONE TABLET BY MOUTH DAILY 90 tablet 4   • tadalafil (CIALIS) 5 MG tablet Take  by mouth.     • Testosterone 20.25 MG/ACT (1.62%) gel Place  on the skin.     • VASCEPA 1 g capsule capsule TAKE TWO CAPSULES BY MOUTH TWICE A DAY WITH MEALS 120 capsule 10     No current facility-administered medications on file prior to visit.      Allergies as of 02/10/2020 - Reviewed 02/10/2020   Allergen Reaction Noted   • Pravastatin  10/24/2017     Social History     Socioeconomic History   • Marital status:      Spouse name: Not on file   • Number of children: Not on file   • Years of education: Not on file   • Highest education level: Not on file   Tobacco Use   • Smoking status: Never Smoker   • Smokeless tobacco: Never Used   • Tobacco comment: caffeine use-coffee 2 cups daily   Substance and Sexual Activity   • Alcohol use: Yes     Comment: 3 beers on weekends   • Drug use: No   • Sexual activity: Defer     Family History   Problem Relation Age of Onset   • Aneurysm Father 50        Ruptured cerebral aneurysm   • Cancer Sister    • Early death Sister        Review of Systems   Constitution: Positive for malaise/fatigue.   HENT: Positive for hearing loss.    All other systems reviewed and are  "negative.     Objective:     Vitals:    02/10/20 1255   BP: 128/80   Pulse: 70   SpO2: 95%   Weight: 86.5 kg (190 lb 9.6 oz)   Height: 176.5 cm (69.49\")     Body mass index is 27.75 kg/m².    Physical Exam   Constitutional: He is oriented to person, place, and time. He appears well-developed and well-nourished.   HENT:   Head: Normocephalic and atraumatic.   Eyes: Conjunctivae are normal.   Neck: Neck supple.   Cardiovascular: Normal rate and regular rhythm. Exam reveals no gallop and no friction rub.   No murmur heard.  Pulmonary/Chest: Effort normal and breath sounds normal.   Abdominal: Soft. There is no tenderness.   Musculoskeletal: He exhibits no edema.   Neurological: He is alert and oriented to person, place, and time.   Skin: Skin is warm.   Psychiatric: He has a normal mood and affect. His behavior is normal.     Lab Review:   Procedures    Cardiac Procedures:  1. Holter monitor on 5/5/2016: The average heart rate was 60 bpm with a minimum   heart rate of 44 bpm, and a maximum heart rate of 88 bpm. There were an average  of 110 PVCs per hour (which were unifocal). There was frequent ventricular bigeminy.   There were no ventricular arrhythmias.  2. Lexiscan Cardiolite stress test on 5/6/2016: There was a small to moderate size   and moderately reversible defect in the distal inferior wall and apex consistent with   ischemia.  3. Left heart catheterization on 5/6/2016 by Dr. August: The left main was normal. The   left circumflex had 30% proximal disease area the first obtuse marginal branch had   30% mid disease. The LAD had a 30% proximal lesion, 50% mid lesion, and 80-90%   disease near the apex (small portion of the vessel). Overall, the LAD appeared   diffusely diseased and severely calcified. The right coronary artery had 30% mid   disease and 10% distal disease. The ejection fraction was 55% on the   ventriculogram. Medical management was recommended at that time.       Assessment:       Diagnosis " Plan   1. Coronary artery disease involving native coronary artery of native heart without angina pectoris     2. PVC's (premature ventricular contractions)     3. Elevated troponin level not due myocardial infarction     4. Dyslipidemia     5. Hypertriglyceridemia       Plan:       The patient seems to be doing very well.  He is tolerating the Lipitor at 10 mg per day.  He   has had memory impairment with pravastatin in the past.  He is taking the Vascepa 1 g in   the morning and 2 g in the evening.  When taking 2 g in the morning, this caused fatigue.    He will need his lipid panel checked before his next visit with me in 6 months.  He will   continue on the aspirin for his coronary artery disease.  He is tolerating the atenolol at 100   mg per day, and he does not feel any PVCs recently.  His blood pressure has been good   on the atenolol and losartan.  I did not change any medications today.  I will see him back   in the office in 6 months.

## 2020-05-15 NOTE — PROGRESS NOTES
"Date of Office Visit: 2018  Encounter Provider: Yuan Vázquez MD  Place of Service: [facility] CARDIOLOGY  Patient Name: Jesus Islas  :1950    Chief complaint: Follow-up coronary artery disease, PVCs, chronically elevated   troponin, dyslipidemia, and hypertriglyceridemia.       History of Present Illness:    Dear Dr. Griggs:      I again had the pleasure of seeing your patient in cardiology office on 2018. As you   well know, he is a very pleasant 67 year-old white male with a past medical history   significant for FSH muscular dystrophy, frequent PVC's, coronary artery disease, and   dyslipidemia who presents for follow-up.       The patient was admitted on 2016 with palpitations for several days prior to the   admission. He had stated that his heart was \"skipping beats\". In the emergency   department, he was noted to have multiple PVCs, including patterns of bigeminy at   times. He had also had chest tightness and pressure 2 weeks prior to presentation,   and his troponin was elevated at 0.092 (cutoff for MI at 0.90). His troponin then came   back at 0.059 and again at 0.057 on the next 2 tests. He did wear a Holter monitor during   his hospital stay which showed an average of 110 PVCs per hour which were unifocal in   nature. He did have frequent bigeminal episodes, but no ventricular tachycardia. His TSH   was checked and was normal, and he was continued on his home dose of atenolol. He   then had a Lexiscan Cardiolite stress test performed on 2016 which showed   ischemia at the distal inferior wall and apex. A subsequent left heart catheterization was   performed on 2016 by Dr. August which showed significant disease in the LAD.   Specifically, he had 30% proximal disease, 50% mid disease, and 80-90% disease in the   apical portion of the LAD. However, the LAD was felt to be too small to intervene in the   apical area, and medical therapy was " 12 Hour Chart Check   recommended. His ejection fraction on the   ventriculogram was 55%. He was started on a very low dose of pravastatin at 10 mg per  day given his muscular dystrophy and a history of myalgias with statins in the past after   being diagnosed with this. He also has a history of significant dyslipidemia, including   grossly elevated triglycerides (greater than 1100 in the past), elevated LDL, and a low   HDL. It was later determined that he does have a chronically elevated troponin.  He   did develop memory impairment with pravastatin, but has tolerated low-dose Lipitor.      The patient presents today for follow-up.  He has not had any chest discomfort or   shortness of breath with exertion.  However, his most recent lipid panel from 4/23/2018   again showed significant dyslipidemia with a triglyceride level of 406, , and HDL   46.  His liver function tests were normal.  He does state that he did drink more beer   this weekend and normal.  His memory function has been fine on the low-dose Lipitor.    He does state that when the dose was increased in the past, he noticed issues with   his stamina.    Past Medical History:   Diagnosis Date   • Arthritis    • BPH (benign prostatic hyperplasia)    • CAD (coronary artery disease)    • Coronary artery disease     Cath 5/6/16: LAD 30% prox, 50% mid, 80-90% apical (small portion of vessel).  LCx and RCA both with up to 30% disease.  Medical management recommended at that time.   • Diverticulosis of colon    • Dyslipidemia     Including severe hypertriglyceridemia.  Also with elevated LDL and low HDL.   • Elevated troponin     Chronically elevated troponin (likely from muscular dystrophy)   • FSH (facioscapulohumeral muscular dystrophy)    • Hearing aid worn     Bilateral    • Hypertension    • Hypogonadism male    • SANDRA on CPAP    • PVC (premature ventricular contraction)     Frequent        Past Surgical History:   Procedure Laterality Date   • CARDIAC CATHETERIZATION  N/A 5/6/2016    Procedure: Coronary angiography;  Surgeon: Ly August MD;  Location:  [facility];  Service:    • CARDIAC CATHETERIZATION N/A 5/6/2016    Procedure: Left ventriculography;  Surgeon: Ly August MD;  Location:  [facility];  Service:    • CARDIAC CATHETERIZATION N/A 5/6/2016    Procedure: Left Heart Cath;  Surgeon: Ly August MD;  [facility];  Service:    • HERNIA REPAIR      Bilateral inguinal hernia repair and umbilical hernia repair in past    • IN RT/LT HEART CATHETERS N/A 5/6/2016    Procedure: Percutaneous Coronary Intervention;  Surgeon: Ly August MD;  Location:  [facility] CATH [facility]VAS[facility];  Service: Cardiovascular       Current Outpatient Prescriptions on File Prior to Visit   Medication Sig Dispense Refill   • ASPIRIN LOW DOSE 81 MG EC tablet TAKE ONE TABLET BY MOUTH DAILY 30 tablet 6   • atenolol (TENORMIN) 100 MG tablet TAKE ONE TABLET BY MOUTH DAILY 30 tablet 0   • atorvastatin (LIPITOR) 10 MG tablet Take 1 tablet by mouth Daily. 30 tablet 11   • B Complex Vitamins (VITAMIN B COMPLEX PO) Take  by mouth.     • losartan (COZAAR) 100 MG tablet TAKE ONE TABLET BY MOUTH DAILY 90 tablet 0   • tadalafil (CIALIS) 5 MG tablet Take  by mouth.     • Testosterone 20.25 MG/ACT (1.62%) gel Place  on the skin.     • [DISCONTINUED] atenolol (TENORMIN) 100 MG tablet TAKE ONE TABLET BY MOUTH DAILY 30 tablet 0     No current facility-administered medications on file prior to visit.      Allergies as of 04/24/2018 - Reviewed 04/24/2018   Allergen Reaction Noted   • Pravastatin  10/24/2017     Social History     Social History   • Marital status:      Spouse name: N/A   • Number of children: N/A   • Years of education: N/A     Occupational History   • Not on file.     Social History Main Topics   • Smoking status: Never Smoker   • Smokeless tobacco: Never Used   • Alcohol use Yes      Comment: 3 beers on weekends   •  "Drug use: No   • Sexual activity: Defer     Other Topics Concern   • Not on file     Social History Narrative   • No narrative on file     Family History   Problem Relation Age of Onset   • Aneurysm Father 50     Ruptured cerebral aneurysm   • Cancer Sister    • Early death Sister        Review of Systems   All other systems reviewed and are negative.     Objective:     Vitals:    04/24/18 1128   BP: 128/80   Pulse: 56   SpO2: 95%   Weight: 82.1 kg (181 lb)   Height: 176.5 cm (69.49\")     Body mass index is 26.35 kg/m².    Physical Exam   Constitutional: He is oriented to person, place, and time. He appears well-developed and well-nourished.   HENT:   Head: Normocephalic and atraumatic.   Eyes: Conjunctivae are normal.   Neck: Neck supple.   Cardiovascular: Normal rate and regular rhythm.  Exam reveals no gallop and no friction rub.    No murmur heard.  Pulmonary/Chest: Effort normal and breath sounds normal.   Abdominal: Soft. There is no tenderness.   Musculoskeletal: He exhibits no edema.   Neurological: He is alert and oriented to person, place, and time.   Skin: Skin is warm.   Psychiatric: He has a normal mood and affect. His behavior is normal.     Lab Review:   Procedures    Cardiac Procedures:  1. Holter monitor on 5/5/2016: The average heart rate was 60 bpm with a minimum   heart rate of 44 bpm, and a maximum heart rate of 88 bpm. There were an average  of 110 PVCs per hour (which were unifocal). There was frequent ventricular bigeminy.   There were no ventricular arrhythmias.  2. Lexiscan Cardiolite stress test on 5/6/2016: There was a small to moderate size   and moderately reversible defect in the distal inferior wall and apex consistent with   ischemia.  3. Left heart catheterization on 5/6/2016 by Dr. August: The left main was normal. The   left circumflex had 30% proximal disease area the first obtuse marginal branch had   30% mid disease. The LAD had a 30% proximal lesion, 50% mid lesion, and 80-90% "   disease near the apex (small portion of the vessel). Overall, the LAD appeared   diffusely diseased and severely calcified. The right coronary artery had 30% mid   disease and 10% distal disease. The ejection fraction was 55% on the   ventriculogram. Medical management was recommended at that time.    Assessment:       Diagnosis Plan   1. Coronary artery disease involving native coronary artery of native heart without angina pectoris  Lipid Panel   2. Hypertriglyceridemia     3. Elevated troponin     4. Frequent PVCs     5. Dyslipidemia       Plan:       I had a long discussion with the patient regarding his triglycerides.  His levels are still quite   high at 406.  Again, some of this may be dietary discretion and beer consumption on the   weekends, although he has been high on multiple occasions.  He is taking the Lipitor at   10 mg per day, and has not done well when going up on the dose.  He has not had   memory impairment as he did with pravastatin.  I am going to try him on Vascepa 2 mg   bid for his triglyceride levels.  I did advise him that this can be expensive at the pharmacy,   and he is going to check on this.  I did give him samples today.  I will recheck a lipid panel   in 1 month.  His liver function tests were recently normal.  We did again talked about   lifestyle modifications for his triglycerides.  He will continue on the aspirin for his coronary   artery disease.  He will also continue on the atenolol at 100 mg per day, and he has been   asymptomatic from a PVC standpoint.  His blood pressure has been controlled, and he   is taking the losartan without any difficulty as well.  I will see him back in 6 months.    Coronary Artery Disease  Assessment  • The patient has no angina    Plan  • Lifestyle modifications discussed include adhering to a heart healthy diet, avoidance of tobacco products, maintenance of a healthy weight, medication compliance, regular exercise and regular monitoring of  cholesterol and blood pressure    Subjective - Objective  • Current antiplatelet therapy includes aspirin 81 mg

## 2020-07-22 RX ORDER — LOSARTAN POTASSIUM 100 MG/1
100 TABLET ORAL DAILY
Qty: 90 TABLET | Refills: 4 | Status: SHIPPED | OUTPATIENT
Start: 2020-07-22 | End: 2021-07-26

## 2020-07-22 RX ORDER — ICOSAPENT ETHYL 1000 MG/1
2 CAPSULE ORAL 2 TIMES DAILY WITH MEALS
Qty: 120 CAPSULE | Refills: 10 | Status: SHIPPED | OUTPATIENT
Start: 2020-07-22 | End: 2020-12-16

## 2020-07-31 ENCOUNTER — OFFICE VISIT (OUTPATIENT)
Dept: SLEEP MEDICINE | Facility: HOSPITAL | Age: 70
End: 2020-07-31

## 2020-07-31 VITALS
BODY MASS INDEX: 26.2 KG/M2 | DIASTOLIC BLOOD PRESSURE: 89 MMHG | SYSTOLIC BLOOD PRESSURE: 151 MMHG | WEIGHT: 183 LBS | OXYGEN SATURATION: 97 % | HEART RATE: 64 BPM | HEIGHT: 70 IN

## 2020-07-31 DIAGNOSIS — G47.33 OBSTRUCTIVE SLEEP APNEA: Primary | ICD-10-CM

## 2020-07-31 PROCEDURE — G0463 HOSPITAL OUTPT CLINIC VISIT: HCPCS

## 2020-07-31 NOTE — PROGRESS NOTES
[facility]  Telephone: [phone] / Fax: [phone] [place]  Telephone: [phone] / Fax: [phone] Roseline Diez    PCP: Hallie Crockett APRN    Reason for visit: SANDRA f/u    Jesus Islas is a 69 y.o.male  was last seen at [facility] sleep lab in  February 2020. He loves his machine and mask. His machine is very quiet. Sleep quality has improved in comparison to pre CPAP treatment, and excessive sleepiness/snoring is no longer an issue.  He gets about 8 hours of sleep per night. He likes the dreamwear headgear and nasal cushion. It is so much more comfortable. He denies changes to his health. He has muscular dystrophy and decreased ROM in the upper extremity due to progressive muscle loss. Other than that, no health changes.    SH-no tobacco, no alcohol, 1 coffee, 0 energy drinks.    ROS- negative.    DME [facility]    Current Medications:    Current Outpatient Medications:   •  ASPIRIN LOW DOSE 81 MG EC tablet, TAKE ONE TABLET BY MOUTH DAILY, Disp: 30 tablet, Rfl: 6  •  atenolol (TENORMIN) 100 MG tablet, TAKE ONE TABLET BY MOUTH DAILY, Disp: 30 tablet, Rfl: 6  •  atorvastatin (LIPITOR) 10 MG tablet, TAKE ONE TABLET BY MOUTH DAILY, Disp: 90 tablet, Rfl: 1  •  icosapent ethyl (Vascepa) 1 g capsule capsule, Take 2 g by mouth 2 (Two) Times a Day With Meals., Disp: 120 capsule, Rfl: 10  •  losartan (COZAAR) 100 MG tablet, Take 1 tablet by mouth Daily., Disp: 90 tablet, Rfl: 4  •  tadalafil (CIALIS) 5 MG tablet, Take  by mouth., Disp: , Rfl:   •  Testosterone 20.25 MG/ACT (1.62%) gel, Place  on the skin., Disp: , Rfl:    also entered in Sleep Questionnaire    Patient  has a past medical history of Arthritis, BPH (benign prostatic hyperplasia), CAD (coronary artery disease), Coronary artery disease, Diverticulosis of colon, Dyslipidemia, Elevated troponin, FSH (facioscapulohumeral muscular dystrophy) (CMS/HCC), Hearing aid worn, Hypertension, Hypogonadism male, SANDRA on CPAP, and PVC  "(premature ventricular contraction).    I have reviewed the Past Medical History, Past Surgical History, Social History and Family History.    Vital Signs /89   Pulse 64   Ht 176.5 cm (69.5\")   Wt 83 kg (183 lb)   SpO2 97%   BMI 26.64 kg/m²  Body mass index is 26.64 kg/m².    General Alert and oriented. No acute distress noted   Pharynx/Throat Class IV Mallampati airway, large tongue, no evidence of redundant lateral pharyngeal tissue. No oral lesions. No thrush. Moist mucous membranes.   Head Normocephalic. Symmetrical. Atraumatic.    Nose No septal deviation. No drainage   Chest Wall Normal shape. Symmetric expansion with respiration. No tenderness.   Neck Trachea midline, no thyromegaly or adenopathy    Lungs Clear to auscultation bilaterally. No wheezes. No rhonchi. No rales. Respirations regular, even and unlabored.   Heart Regular rhythm and normal rate. Normal S1 and S2. No murmur   Abdomen Soft, non-tender and non-distended. Normal bowel sounds. No masses.   Extremities Moves all extremities well. No edema   Psychiatric Normal mood and affect.     Testing:  Download 4/30/20- 7/28/20- 97% use with average nightly use of 8 hours and 32 minutes on auto CPAP 8-12cm with AHI 0.2, leak of 19.3 L/min.    Study-PSG 2007-severe SANDRA with AHI 37-treated with BIPAP 12/7.    · 11/18/19  Overnight split polysomnogram study.  Diagnostic study from 11:14 PM to 1:11 AM.  Sleep efficiency very poor at 52% with absence of slow-wave sleep and REM sleep.  AHI index 6.  Patient slept supine for 60 minutes.  There was absence of REM sleep.  Oxygen saturation remained above 88%.  Arousal index 35.  PLM index 15 with PLM arousal index 4.  Patient had 17% time snoring.    Titration study from 1:11 AM to 5:21 AM.  Sleep efficiency 87.5% with 3.64 hours of total sleep time.  Sleep distribution shows rebound and slow-wave sleep to 32% and an increase in rem sleep to 16.7%.  AHI index is improved.  Adequate supine sleep at 218 " minutes.  Patient at 36 minutes REM sleep.  Oxygen saturation remained above 88%.  Snoring not recorded.  CPAP increased from 5 to 11 cm water pressure.  Maximum sleep at 9 cm water pressure.  Maximum REM sleep at same pressure.  Supine and REM sleep was achieved at 9 cm water pressure.  Air fit F 20 fullface mask was used.    Impression:  1. Obstructive sleep apnea          Plan:  Patient uses the CPAP device and benefits from its use in terms of reduction of hypersomnia and snoring.  AHI appears to be within adequate range. I reviewed download report and original sleep study report with the patient.      Changing of PAP supplies regularly is important for effective use.  Nasal pillows should be changed twice per month, tubing every 3 months, disposable filters twice per  month, water chamber every 6 months. Nasal mask frame should be changed every 3 months, nasal mask cushion twice per month and headgear every 6 months. Full face mask frame should be replaced every 3 months, full face cushion monthly and headgear every 6 months..    Follow up with Dr. Rey in one year    Thank you for allowing me to participate in your patient's care.      SHIRA Go  [facility]  Phone: [phone]      Part of this note may be an electronic transcription/translation of spoken language to printed text using the Dragon Dictation System.

## 2020-08-14 RX ORDER — LOSARTAN POTASSIUM 100 MG/1
TABLET ORAL
Qty: 90 TABLET | Refills: 4 | OUTPATIENT
Start: 2020-08-14

## 2020-08-17 ENCOUNTER — PREP FOR SURGERY (OUTPATIENT)
Dept: OTHER | Facility: HOSPITAL | Age: 70
End: 2020-08-17

## 2020-08-17 DIAGNOSIS — Z80.0 FAMILY HISTORY OF MALIGNANT NEOPLASM OF GASTROINTESTINAL TRACT: Primary | ICD-10-CM

## 2020-08-18 ENCOUNTER — TRANSCRIBE ORDERS (OUTPATIENT)
Dept: SLEEP MEDICINE | Facility: HOSPITAL | Age: 70
End: 2020-08-18

## 2020-08-18 DIAGNOSIS — Z01.818 OTHER SPECIFIED PRE-OPERATIVE EXAMINATION: Primary | ICD-10-CM

## 2020-08-21 ENCOUNTER — LAB (OUTPATIENT)
Dept: LAB | Facility: HOSPITAL | Age: 70
End: 2020-08-21

## 2020-08-21 DIAGNOSIS — Z01.818 OTHER SPECIFIED PRE-OPERATIVE EXAMINATION: ICD-10-CM

## 2020-08-21 PROCEDURE — C9803 HOPD COVID-19 SPEC COLLECT: HCPCS

## 2020-08-21 PROCEDURE — U0004 COV-19 TEST NON-CDC HGH THRU: HCPCS

## 2020-08-22 LAB
REF LAB TEST METHOD: NORMAL
SARS-COV-2 RNA RESP QL NAA+PROBE: NOT DETECTED

## 2020-08-24 ENCOUNTER — ANESTHESIA (OUTPATIENT)
Dept: GASTROENTEROLOGY | Facility: HOSPITAL | Age: 70
End: 2020-08-24

## 2020-08-24 ENCOUNTER — HOSPITAL ENCOUNTER (OUTPATIENT)
Facility: HOSPITAL | Age: 70
Setting detail: HOSPITAL OUTPATIENT SURGERY
Discharge: HOME | End: 2020-08-24
Attending: SURGERY | Admitting: SURGERY

## 2020-08-24 ENCOUNTER — ANESTHESIA EVENT (OUTPATIENT)
Dept: GASTROENTEROLOGY | Facility: HOSPITAL | Age: 70
End: 2020-08-24

## 2020-08-24 VITALS
HEART RATE: 55 BPM | OXYGEN SATURATION: 96 % | WEIGHT: 175.4 LBS | SYSTOLIC BLOOD PRESSURE: 116 MMHG | TEMPERATURE: 98 F | HEIGHT: 70 IN | BODY MASS INDEX: 25.11 KG/M2 | DIASTOLIC BLOOD PRESSURE: 83 MMHG | RESPIRATION RATE: 18 BRPM

## 2020-08-24 PROCEDURE — 25010000002 PROPOFOL 10 MG/ML EMULSION: Performed by: ANESTHESIOLOGY

## 2020-08-24 RX ORDER — PROPOFOL 10 MG/ML
VIAL (ML) INTRAVENOUS AS NEEDED
Status: DISCONTINUED | OUTPATIENT
Start: 2020-08-24 | End: 2020-08-24 | Stop reason: SURG

## 2020-08-24 RX ORDER — LABETALOL HYDROCHLORIDE 5 MG/ML
5 INJECTION, SOLUTION INTRAVENOUS
Status: DISCONTINUED | OUTPATIENT
Start: 2020-08-24 | End: 2020-08-24 | Stop reason: HOSPADM

## 2020-08-24 RX ORDER — FENTANYL CITRATE 50 UG/ML
25 INJECTION, SOLUTION INTRAMUSCULAR; INTRAVENOUS
Status: DISCONTINUED | OUTPATIENT
Start: 2020-08-24 | End: 2020-08-24 | Stop reason: HOSPADM

## 2020-08-24 RX ORDER — HYDRALAZINE HYDROCHLORIDE 20 MG/ML
5 INJECTION INTRAMUSCULAR; INTRAVENOUS
Status: DISCONTINUED | OUTPATIENT
Start: 2020-08-24 | End: 2020-08-24 | Stop reason: HOSPADM

## 2020-08-24 RX ORDER — LIDOCAINE HYDROCHLORIDE 20 MG/ML
INJECTION, SOLUTION INFILTRATION; PERINEURAL AS NEEDED
Status: DISCONTINUED | OUTPATIENT
Start: 2020-08-24 | End: 2020-08-24 | Stop reason: SURG

## 2020-08-24 RX ORDER — SODIUM CHLORIDE, SODIUM LACTATE, POTASSIUM CHLORIDE, CALCIUM CHLORIDE 600; 310; 30; 20 MG/100ML; MG/100ML; MG/100ML; MG/100ML
30 INJECTION, SOLUTION INTRAVENOUS CONTINUOUS PRN
Status: DISCONTINUED | OUTPATIENT
Start: 2020-08-24 | End: 2020-08-24 | Stop reason: HOSPADM

## 2020-08-24 RX ORDER — DIPHENHYDRAMINE HYDROCHLORIDE 50 MG/ML
6.25 INJECTION INTRAMUSCULAR; INTRAVENOUS
Status: DISCONTINUED | OUTPATIENT
Start: 2020-08-24 | End: 2020-08-24 | Stop reason: HOSPADM

## 2020-08-24 RX ORDER — ONDANSETRON 2 MG/ML
4 INJECTION INTRAMUSCULAR; INTRAVENOUS ONCE AS NEEDED
Status: DISCONTINUED | OUTPATIENT
Start: 2020-08-24 | End: 2020-08-24 | Stop reason: HOSPADM

## 2020-08-24 RX ORDER — EPHEDRINE SULFATE 50 MG/ML
5 INJECTION, SOLUTION INTRAVENOUS ONCE AS NEEDED
Status: DISCONTINUED | OUTPATIENT
Start: 2020-08-24 | End: 2020-08-24 | Stop reason: HOSPADM

## 2020-08-24 RX ORDER — NALOXONE HCL 0.4 MG/ML
0.4 VIAL (ML) INJECTION AS NEEDED
Status: DISCONTINUED | OUTPATIENT
Start: 2020-08-24 | End: 2020-08-24 | Stop reason: HOSPADM

## 2020-08-24 RX ADMIN — PROPOFOL 120 MG: 10 INJECTION, EMULSION INTRAVENOUS at 09:31

## 2020-08-24 RX ADMIN — LIDOCAINE HYDROCHLORIDE 60 MG: 20 INJECTION, SOLUTION INFILTRATION; PERINEURAL at 09:31

## 2020-08-24 RX ADMIN — SODIUM CHLORIDE, POTASSIUM CHLORIDE, SODIUM LACTATE AND CALCIUM CHLORIDE 30 ML/HR: 600; 310; 30; 20 INJECTION, SOLUTION INTRAVENOUS at 09:22

## 2020-08-24 RX ADMIN — PROPOFOL 160 MCG/KG/MIN: 10 INJECTION, EMULSION INTRAVENOUS at 09:31

## 2020-08-24 NOTE — ANESTHESIA PREPROCEDURE EVALUATION
Anesthesia Evaluation     no history of anesthetic complications:  NPO Solid Status: > 8 hours  NPO Liquid Status: > 2 hours           Airway   Mallampati: II  Neck ROM: full  no difficulty expected  Dental - normal exam     Pulmonary - normal exam   (+) sleep apnea on CPAP,   (-) COPD, asthma, not a smoker    PE comment: nonlabored  Cardiovascular - normal exam    Rhythm: regular  Rate: normal    (+) hypertension, CAD (med mgmt only), dysrhythmias PVC, hyperlipidemia,   (-) valvular problems/murmurs, past MI, angina      Neuro/Psych- negative ROS  (-) seizures, TIA, CVA  GI/Hepatic/Renal/Endo - negative ROS   (-) GERD, liver disease, no renal disease, diabetes, no thyroid disorder    Musculoskeletal         ROS comment: Facioscapulohumeral muscular dystrophy  Abdominal    Substance History      OB/GYN          Other   arthritis,                    Anesthesia Plan    ASA 3     MAC       Anesthetic plan, all risks, benefits, and alternatives have been provided, discussed and informed consent has been obtained with: patient.

## 2020-08-24 NOTE — ANESTHESIA POSTPROCEDURE EVALUATION
"Patient: Jesus Islas    Procedure Summary     Date:  08/24/20 Room / Location:   [facility] /  [facility]    Anesthesia Start:  0927 Anesthesia Stop:  0948    Procedure:  COLONOSCOPY INTO CECUM (N/A ) Diagnosis:       Family history of malignant neoplasm of gastrointestinal tract      (Family history of malignant neoplasm of gastrointestinal tract [Z80.0])    Surgeon:  Kd Cottrell MD Provider:  Wander Otero MD    Anesthesia Type:  MAC ASA Status:  3          Anesthesia Type: MAC    Vitals  No vitals data found for the desired time range.          Post Anesthesia Care and Evaluation    Patient location during evaluation: bedside  Patient participation: complete - patient participated  Level of consciousness: sleepy but conscious  Pain management: adequate  Airway patency: patent  Anesthetic complications: No anesthetic complications    Cardiovascular status: acceptable  Respiratory status: acceptable  Hydration status: acceptable    Comments: */98 (BP Location: Left arm, Patient Position: Lying)   Pulse 57   Temp 36.7 °C (98 °F) (Oral)   Resp 18   Ht 177.8 cm (70\")   Wt 79.6 kg (175 lb 6.4 oz)   SpO2 96%   BMI 25.17 kg/m²         "

## 2020-08-24 NOTE — H&P
Jesus Islas is a 69 y.o. male who presents today for a Colonoscopy.  Patient has a family history of colon cancer in his sister and a personal history of colon polyps and is due for recheck.  He denies any GI complaints or problems.    Past Medical History:   Diagnosis Date   • Arthritis    • BPH (benign prostatic hyperplasia)    • CAD (coronary artery disease)    • Coronary artery disease     Cath 5/6/16: LAD 30% prox, 50% mid, 80-90% apical (small portion of vessel).  LCx and RCA both with up to 30% disease.  Medical management recommended at that time.   • Diverticulosis of colon    • Dyslipidemia     Including severe hypertriglyceridemia.  Also with elevated LDL and low HDL.   • Elevated troponin     Chronically elevated troponin (likely from muscular dystrophy)   • FSH (facioscapulohumeral muscular dystrophy) (CMS/HCC)    • Hearing aid worn     Bilateral    • Hypertension    • Hypogonadism male    • SANDRA on CPAP    • PVC (premature ventricular contraction)     Frequent          Objective     Pt is in no distress.  Heart regular.  Chest clear.  Abdomen soft.  Rectal deferred to endoscopy.      Assessment/Plan      Plan a Colonoscopy today.  Risks and benefits were discussed.  Patient is agreeable.  Final recommendations will follow depending on the results.    Kd Cottrell M.D.

## 2020-09-17 ENCOUNTER — OFFICE VISIT (OUTPATIENT)
Dept: CARDIOLOGY | Facility: CLINIC | Age: 70
End: 2020-09-17

## 2020-09-17 ENCOUNTER — TELEPHONE (OUTPATIENT)
Dept: CARDIOLOGY | Facility: CLINIC | Age: 70
End: 2020-09-17

## 2020-09-17 ENCOUNTER — LAB (OUTPATIENT)
Dept: LAB | Facility: HOSPITAL | Age: 70
End: 2020-09-17

## 2020-09-17 VITALS
DIASTOLIC BLOOD PRESSURE: 84 MMHG | WEIGHT: 183.6 LBS | SYSTOLIC BLOOD PRESSURE: 130 MMHG | BODY MASS INDEX: 26.28 KG/M2 | HEART RATE: 56 BPM | HEIGHT: 70 IN

## 2020-09-17 DIAGNOSIS — I25.10 CORONARY ARTERY DISEASE INVOLVING NATIVE CORONARY ARTERY OF NATIVE HEART WITHOUT ANGINA PECTORIS: ICD-10-CM

## 2020-09-17 DIAGNOSIS — G47.33 OBSTRUCTIVE SLEEP APNEA SYNDROME: ICD-10-CM

## 2020-09-17 DIAGNOSIS — I10 ESSENTIAL HYPERTENSION: ICD-10-CM

## 2020-09-17 DIAGNOSIS — G71.09 HEREDITARY PROGRESSIVE MUSCULAR DYSTROPHY (HCC): ICD-10-CM

## 2020-09-17 DIAGNOSIS — I25.10 CORONARY ARTERY DISEASE INVOLVING NATIVE CORONARY ARTERY OF NATIVE HEART WITHOUT ANGINA PECTORIS: Primary | ICD-10-CM

## 2020-09-17 DIAGNOSIS — I49.3 PVC (PREMATURE VENTRICULAR CONTRACTION): ICD-10-CM

## 2020-09-17 DIAGNOSIS — E78.2 MIXED HYPERLIPIDEMIA: ICD-10-CM

## 2020-09-17 LAB
ALBUMIN SERPL-MCNC: 4.1 G/DL (ref 3.5–5.2)
ALBUMIN/GLOB SERPL: 1.5 G/DL
ALP SERPL-CCNC: 49 U/L (ref 39–117)
ALT SERPL W P-5'-P-CCNC: 34 U/L (ref 1–41)
ANION GAP SERPL CALCULATED.3IONS-SCNC: 9.2 MMOL/L (ref 5–15)
AST SERPL-CCNC: 21 U/L (ref 1–40)
BILIRUB SERPL-MCNC: 0.4 MG/DL (ref 0–1.2)
BUN SERPL-MCNC: 15 MG/DL (ref 8–23)
BUN/CREAT SERPL: 20.3 (ref 7–25)
CALCIUM SPEC-SCNC: 9.2 MG/DL (ref 8.6–10.5)
CHLORIDE SERPL-SCNC: 105 MMOL/L (ref 98–107)
CHOLEST SERPL-MCNC: 241 MG/DL (ref 0–200)
CO2 SERPL-SCNC: 24.8 MMOL/L (ref 22–29)
CREAT SERPL-MCNC: 0.74 MG/DL (ref 0.76–1.27)
DEPRECATED RDW RBC AUTO: 42.5 FL (ref 37–54)
ERYTHROCYTE [DISTWIDTH] IN BLOOD BY AUTOMATED COUNT: 11.7 % (ref 12.3–15.4)
GFR SERPL CREATININE-BSD FRML MDRD: 104 ML/MIN/1.73
GLOBULIN UR ELPH-MCNC: 2.8 GM/DL
GLUCOSE SERPL-MCNC: 101 MG/DL (ref 65–99)
HCT VFR BLD AUTO: 47 % (ref 37.5–51)
HDLC SERPL-MCNC: 50 MG/DL (ref 40–60)
HGB BLD-MCNC: 15.8 G/DL (ref 13–17.7)
LDLC SERPL CALC-MCNC: 135 MG/DL (ref 0–100)
LDLC/HDLC SERPL: 2.69 {RATIO}
MCH RBC QN AUTO: 32.6 PG (ref 26.6–33)
MCHC RBC AUTO-ENTMCNC: 33.6 G/DL (ref 31.5–35.7)
MCV RBC AUTO: 96.9 FL (ref 79–97)
PLATELET # BLD AUTO: 177 10*3/MM3 (ref 140–450)
PMV BLD AUTO: 10 FL (ref 6–12)
POTASSIUM SERPL-SCNC: 4.4 MMOL/L (ref 3.5–5.2)
PROT SERPL-MCNC: 6.9 G/DL (ref 6–8.5)
RBC # BLD AUTO: 4.85 10*6/MM3 (ref 4.14–5.8)
SODIUM SERPL-SCNC: 139 MMOL/L (ref 136–145)
TRIGL SERPL-MCNC: 282 MG/DL (ref 0–150)
VLDLC SERPL-MCNC: 56.4 MG/DL (ref 5–40)
WBC # BLD AUTO: 5.54 10*3/MM3 (ref 3.4–10.8)

## 2020-09-17 PROCEDURE — 93000 ELECTROCARDIOGRAM COMPLETE: CPT | Performed by: NURSE PRACTITIONER

## 2020-09-17 PROCEDURE — 80061 LIPID PANEL: CPT

## 2020-09-17 PROCEDURE — 85027 COMPLETE CBC AUTOMATED: CPT

## 2020-09-17 PROCEDURE — 80053 COMPREHEN METABOLIC PANEL: CPT

## 2020-09-17 PROCEDURE — 99214 OFFICE O/P EST MOD 30 MIN: CPT | Performed by: NURSE PRACTITIONER

## 2020-09-17 PROCEDURE — 36415 COLL VENOUS BLD VENIPUNCTURE: CPT

## 2020-09-17 NOTE — PROGRESS NOTES
Date of Office Visit: 2020  Encounter Provider: SHIRA Lou  Place of Service: [facility] CARDIOLOGY  Patient Name: Jesus Islas  :1950  Primary Cardiologist: Dr. Kevon Vázquez    Chief Complaint   Patient presents with   • Coronary Artery Disease   • Annual Exam   • Follow-up   :     Dear SHIRA Moncada,     HPI: Jesus Islas is a pleasant 70 y.o. male who presents today for 6 month cardiac follow up.  I am seeing him today because Dr. Kevon Vázquez is out of the office. He is a new patient to me and his previous records have been reviewed.    He has a known history of muscular dystrophy, coronary artery disease, premature ventricular contractions (PVCs), and dyslipidemia.    In May 2016, he had an abnormal Lexiscan Cardiolite stress test.  Subsequently, he underwent cardiac catheterization on 2016 which showed nonobstructive coronary artery disease and 80-90% disease in the apical portion of the LAD.  The LAD was felt to be too small to intervene upon and medical therapy was recommended.  Ejection fraction was normal at 55%.    In 2020, he followed up in the office with Dr. Kevon Vázquez.  He reported feeling his PVCs when he was more anxious lasting 5-10 minutes at a time.  He was biking 20 miles every other day without difficulty.  Dr. Vázquez recommended that he continue with the same medications and follow-up with him in 6 months.  Dr. Vázquez had also recommended a repeat lipid panel.    Today is his follow-up visit.  He remains very active and rides 10 miles on his bicycle outdoors every other day.  He reports one episode of palpitations in the past year.  He denies chest pain, shortness of air, edema, dizziness, syncope, or bleeding.  He received a new CPAP machine is been very happy with that.  His blood pressure and heart rate are both normal today.  He is due for routine blood work.    Past Medical History:    Diagnosis Date   • Arthritis    • BPH (benign prostatic hyperplasia)    • Coronary artery disease     Cath 5/6/16: LAD 30% prox, 50% mid, 80-90% apical (small portion of vessel).  LCx and RCA both with up to 30% disease.  Medical management recommended at that time.   • Diverticulosis of colon    • Dyslipidemia     Including severe hypertriglyceridemia.  Also with elevated LDL and low HDL.   • Elevated troponin     Chronically elevated troponin (likely from muscular dystrophy)   • FSH (facioscapulohumeral muscular dystrophy) (CMS/HCC)    • Hearing aid worn     Bilateral    • Hypertension    • Hypogonadism male    • SANDRA on CPAP     compliant with CPAP machine   • PVC (premature ventricular contraction)     Frequent        Past Surgical History:   Procedure Laterality Date   • CARDIAC CATHETERIZATION N/A 5/6/2016    Procedure: Coronary angiography;  Surgeon: Ly August MD;  Location: [facility] CATH INVASIVE LOCATION;  Service:    • CARDIAC CATHETERIZATION N/A 5/6/2016    Procedure: Left ventriculography;  Surgeon: Ly August MD;  Location: [facility] CATH INVASIVE LOCATION;  Service:    • CARDIAC CATHETERIZATION N/A 5/6/2016    Procedure: Left Heart Cath;  Surgeon: Ly August MD;  Location: [facility] CATH INVASIVE LOCATION;  Service:    • COLONOSCOPY     • COLONOSCOPY N/A 8/24/2020    Procedure: COLONOSCOPY INTO CECUM;  Surgeon: Kd Cottrell MD;  Location: [facility] ENDOSCOPY;  Service: General;  Laterality: N/A;  PRE:  HX OF POLYPS, FAMILY HX OF COLON CANCER  POST:  DIVERTICULOSIS   • HERNIA REPAIR      Bilateral inguinal hernia repair and umbilical hernia repair in past    • MO RT/LT HEART CATHETERS N/A 5/6/2016    Procedure: Percutaneous Coronary Intervention;  Surgeon: Ly August MD;  Location: [facility] CATH INVASIVE LOCATION;  Service: Cardiovascular       Social History     Socioeconomic History   • Marital status:      Spouse name: Not on file   • Number of children: Not on file   • Years of  education: Not on file   • Highest education level: Not on file   Tobacco Use   • Smoking status: Never Smoker   • Smokeless tobacco: Never Used   • Tobacco comment: caffeine use-coffee 2 cups daily   Substance and Sexual Activity   • Alcohol use: Yes     Comment: 3 beers on weekends   • Drug use: No   • Sexual activity: Defer       Family History   Problem Relation Age of Onset   • Aneurysm Father 50        Ruptured cerebral aneurysm   • Cancer Sister    • Early death Sister        The following portion of the patient's history were reviewed and updated as appropriate: past medical history, past surgical history, past social history, past family history, allergies, current medications, and problem list.    Review of Systems   Constitution: Negative for chills, diaphoresis, fever, malaise/fatigue, night sweats, weight gain and weight loss.   HENT: Negative for hearing loss, nosebleeds, sore throat and tinnitus.    Eyes: Negative for blurred vision, double vision, pain and visual disturbance.   Cardiovascular: Negative for chest pain, claudication, cyanosis, dyspnea on exertion, irregular heartbeat, leg swelling, near-syncope, orthopnea, palpitations, paroxysmal nocturnal dyspnea and syncope.   Respiratory: Positive for snoring. Negative for cough, hemoptysis, shortness of breath and wheezing.    Endocrine: Negative for cold intolerance, heat intolerance and polyuria.   Hematologic/Lymphatic: Negative for bleeding problem. Does not bruise/bleed easily.   Skin: Negative for color change, dry skin, flushing and itching.   Musculoskeletal: Negative for falls, joint pain, joint swelling, muscle cramps, muscle weakness and myalgias.   Gastrointestinal: Negative for abdominal pain, constipation, heartburn, melena, nausea and vomiting.   Genitourinary: Negative for dysuria and hematuria.   Neurological: Negative for excessive daytime sleepiness, dizziness, light-headedness, loss of balance, numbness, paresthesias, seizures  "and vertigo.   Psychiatric/Behavioral: Negative for altered mental status, depression, memory loss and substance abuse. The patient does not have insomnia and is not nervous/anxious.    Allergic/Immunologic: Negative for environmental allergies.       Allergies   Allergen Reactions   • Pravastatin      Memory loss         Current Outpatient Medications:   •  ASPIRIN LOW DOSE 81 MG EC tablet, TAKE ONE TABLET BY MOUTH DAILY, Disp: 30 tablet, Rfl: 6  •  atenolol (TENORMIN) 100 MG tablet, TAKE ONE TABLET BY MOUTH DAILY, Disp: 30 tablet, Rfl: 6  •  atorvastatin (LIPITOR) 10 MG tablet, TAKE ONE TABLET BY MOUTH DAILY, Disp: 90 tablet, Rfl: 1  •  icosapent ethyl (Vascepa) 1 g capsule capsule, Take 2 g by mouth 2 (Two) Times a Day With Meals., Disp: 120 capsule, Rfl: 10  •  losartan (COZAAR) 100 MG tablet, Take 1 tablet by mouth Daily., Disp: 90 tablet, Rfl: 4  •  tadalafil (CIALIS) 5 MG tablet, Take  by mouth., Disp: , Rfl:   •  Testosterone 20.25 MG/ACT (1.62%) gel, Place  on the skin., Disp: , Rfl:         Objective:     Vitals:    09/17/20 1007 09/17/20 1009   BP: 138/82 130/84   BP Location: Left arm Right arm   Pulse: 56    Weight: 83.3 kg (183 lb 9.6 oz)    Height: 177.8 cm (70\")      Body mass index is 26.34 kg/m².    PHYSICAL EXAM:    Vitals Reviewed.   General Appearance: No acute distress, well developed and well nourished.    Eyes: Conjunctiva and lids: No erythema, swelling, or discharge. Sclera non-icteric.   HENT: Atraumatic, normocephalic. External eyes, ears, and nose normal. No hearing loss noted. Mucous membranes normal. Lips not cyanotic. Neck supple with no tenderness.  Respiratory: No signs of respiratory distress. Respiration rhythm and depth normal.   Clear to auscultation. No rales, crackles, rhonchi, or wheezing auscultated.   Cardiovascular:  Jugular Venous Pressure: Normal  Heart Rate and Rhythm: Normal, Heart Sounds: Normal S1 and S2. No S3 or S4 noted.  Murmurs: No murmurs noted. No rubs, " thrills, or gallops.   Arterial Pulses: Carotid pulses normal. No carotid bruit noted.    Lower Extremities: No edema noted.  Gastrointestinal:  Abdomen soft, non-distended, non-tender. Normal bowel sounds.    Musculoskeletal: Normal movement of extremities  Skin and Nails: General appearance normal. No pallor, cyanosis, diaphoresis. Skin temperature normal. No clubbing of fingernails.   Psychiatric: Patient alert and oriented to person, place, and time. Speech and behavior appropriate. Normal mood and affect.       ECG 12 Lead    Date/Time: 9/17/2020 10:09 AM  Performed by: Bebe Dominguez APRN  Authorized by: Bebe Dominguez APRN   Comparison: compared with previous ECG from 9/17/2020  Similar to previous ECG  Rhythm: sinus rhythm  Rate: bradycardic  BPM: 56  Conduction: conduction normal  ST Segments: ST segments normal  T Waves: T waves normal  QRS axis: normal  Other: no other findings    Clinical impression: normal ECG              Assessment:       Diagnosis Plan   1. Coronary artery disease involving native coronary artery of native heart without angina pectoris  CBC (No Diff)    Comprehensive Metabolic Panel    Lipid Panel   2. PVC (premature ventricular contraction)     3. Essential hypertension     4. Mixed hyperlipidemia  Comprehensive Metabolic Panel    Lipid Panel   5. Obstructive sleep apnea syndrome     6. Hereditary progressive muscular dystrophy (CMS/HCC)            Plan:       1.  Coronary Artery Disease: In May 2016, he was diagnosed with stenosis of the LAD with medical management recommended.  He remains on aspirin, atorvastatin, and atenolol for secondary prevention.  He denies anginal symptoms.    2.  History of PVCs: Denies palpitations.  Continue atenolol.    3.  Hypertension: Blood pressure well controlled today on current medication regimen.    4.  Hyperlipidemia: He is due for routine blood work including a CBC, CMP, and lipid panel.    5.  Obstructive Sleep Apnea: Compliant with  CPAP machine.    6.  Muscular Dystrophy: Stable.    7.  I recommend follow-up with Dr. Kevon Vázquez in 1 year, unless otherwise needed sooner.    ADDENDUM 9/17/2020:    CMP normal except for mildly elevated glucose of 101.  Cholesterol is not well controlled.  CBC showed normal hemoglobin and hematocrit.     Contains abnormal data Lipid Panel  Order: [id number]  Status:  Final result   Visible to patient:  Yes (MyChart) Dx:  Mixed hyperlipidemia; Coronary artery...  Specimen Information: Blood        Component   Ref Range & Units 10:48  (9/17/20) 1yr ago  (4/30/19) 1yr ago  (10/17/18)   Total Cholesterol   0 - 200 mg/dL 241High   236High   231High     Triglycerides   0 - 150 mg/dL 282High   297High   173High     HDL Cholesterol   40 - 60 mg/dL 50  43  53    LDL Cholesterol    0 - 100 mg/dL 135High   134High   143High     VLDL Cholesterol   5 - 40 mg/dL 56.4High   59.4High   34.6    LDL/HDL Ratio  2.69  3.11  2.71            Plan:    · His LDL and total cholesterol are not at goal.  · He declines taking a higher dose of atorvastatin.  · I explained that Repatha or Praluent may be very beneficial and would not cause the muscle discomfort.  · He is very fine with where his cholesterol is and does not want to take any additional dosages of the atorvastatin or any more medication.  · I will notify Dr. Vázquez.      As always, it has been a pleasure to participate in your patient's care. Thank you.       Sincerely,       SHIRA Bradshaw  [facility] Cardiology      · COVID-19 Precautions - Patient was compliant in wearing a mask. When I saw the patient, I used appropriate personal protective equipment (PPE) including mask (standard procedure).  Additionally, I used gown, gloves, and eye shield if indicated.  Hand hygiene was completed before and after seeing the patient.  · Dictated utilizing Dragon Dictation

## 2020-09-17 NOTE — TELEPHONE ENCOUNTER
CMP normal except for mildly elevated glucose of 101.  Cholesterol is not well controlled.  CBC showed normal hemoglobin and hematocrit.     Contains abnormal data Lipid Panel  Order: [id number]  Status:  Final result   Visible to patient:  Yes (Amyt) Dx:  Mixed hyperlipidemia; Coronary artery...  Specimen Information: Blood        Component   Ref Range & Units 10:48  (9/17/20) 1yr ago  (4/30/19) 1yr ago  (10/17/18)   Total Cholesterol   0 - 200 mg/dL 241High   236High   231High     Triglycerides   0 - 150 mg/dL 282High   297High   173High     HDL Cholesterol   40 - 60 mg/dL 50  43  53    LDL Cholesterol    0 - 100 mg/dL 135High   134High   143High     VLDL Cholesterol   5 - 40 mg/dL 56.4High   59.4High   34.6    LDL/HDL Ratio  2.69  3.11  2.71            Plan:    · His LDL and total cholesterol are not at goal.  · He declines taking a higher dose of atorvastatin.  · I explained that Repatha or Praluent may be very beneficial and would not cause the muscle discomfort.  · He is very fine with where his cholesterol is and does not want to take any additional dosages of the atorvastatin or any more medication.  · I will notify Dr. Vázquez.

## 2020-10-06 RX ORDER — ATORVASTATIN CALCIUM 10 MG/1
TABLET, FILM COATED ORAL
Qty: 90 TABLET | Refills: 3 | Status: SHIPPED | OUTPATIENT
Start: 2020-10-06 | End: 2021-09-09

## 2020-12-16 RX ORDER — ICOSAPENT ETHYL 1000 MG/1
CAPSULE ORAL
Qty: 120 CAPSULE | Refills: 10 | Status: SHIPPED | OUTPATIENT
Start: 2020-12-16 | End: 2021-10-29

## 2021-02-19 RX ORDER — ATENOLOL 100 MG/1
TABLET ORAL
Qty: 90 TABLET | Refills: 3 | Status: SHIPPED | OUTPATIENT
Start: 2021-02-19 | End: 2021-12-20

## 2021-07-26 RX ORDER — LOSARTAN POTASSIUM 100 MG/1
TABLET ORAL
Qty: 90 TABLET | Refills: 4 | Status: SHIPPED | OUTPATIENT
Start: 2021-07-26 | End: 2022-07-28

## 2021-08-06 ENCOUNTER — APPOINTMENT (OUTPATIENT)
Dept: SLEEP MEDICINE | Facility: HOSPITAL | Age: 71
End: 2021-08-06

## 2021-08-20 ENCOUNTER — OFFICE VISIT (OUTPATIENT)
Dept: SLEEP MEDICINE | Facility: HOSPITAL | Age: 71
End: 2021-08-20

## 2021-08-20 VITALS
DIASTOLIC BLOOD PRESSURE: 85 MMHG | SYSTOLIC BLOOD PRESSURE: 145 MMHG | WEIGHT: 189 LBS | HEART RATE: 66 BPM | HEIGHT: 70 IN | BODY MASS INDEX: 27.06 KG/M2 | OXYGEN SATURATION: 97 %

## 2021-08-20 DIAGNOSIS — G47.33 OBSTRUCTIVE SLEEP APNEA: Primary | ICD-10-CM

## 2021-08-20 DIAGNOSIS — G71.00 MUSCULAR DYSTROPHY (HCC): ICD-10-CM

## 2021-08-20 PROCEDURE — G0463 HOSPITAL OUTPT CLINIC VISIT: HCPCS

## 2021-09-09 RX ORDER — ATORVASTATIN CALCIUM 10 MG/1
TABLET, FILM COATED ORAL
Qty: 90 TABLET | Refills: 3 | Status: SHIPPED | OUTPATIENT
Start: 2021-09-09 | End: 2022-11-01 | Stop reason: SDUPTHER

## 2021-09-23 ENCOUNTER — OFFICE VISIT (OUTPATIENT)
Dept: CARDIOLOGY | Facility: CLINIC | Age: 71
End: 2021-09-23

## 2021-09-23 VITALS
DIASTOLIC BLOOD PRESSURE: 86 MMHG | HEART RATE: 56 BPM | WEIGHT: 188 LBS | BODY MASS INDEX: 27.85 KG/M2 | SYSTOLIC BLOOD PRESSURE: 130 MMHG | HEIGHT: 69 IN

## 2021-09-23 DIAGNOSIS — E78.5 DYSLIPIDEMIA: ICD-10-CM

## 2021-09-23 DIAGNOSIS — E78.1 HYPERTRIGLYCERIDEMIA: ICD-10-CM

## 2021-09-23 DIAGNOSIS — I25.10 CORONARY ARTERY DISEASE INVOLVING NATIVE CORONARY ARTERY OF NATIVE HEART WITHOUT ANGINA PECTORIS: Primary | ICD-10-CM

## 2021-09-23 DIAGNOSIS — I49.3 PVCS (PREMATURE VENTRICULAR CONTRACTIONS): ICD-10-CM

## 2021-09-23 PROCEDURE — 99213 OFFICE O/P EST LOW 20 MIN: CPT | Performed by: INTERNAL MEDICINE

## 2021-09-23 PROCEDURE — 93000 ELECTROCARDIOGRAM COMPLETE: CPT | Performed by: INTERNAL MEDICINE

## 2021-09-23 NOTE — PROGRESS NOTES
"Date of Office Visit: 2021  Encounter Provider: Yuan Vázquez MD  Place of Service: [facility] CARDIOLOGY  Patient Name: Jesus Islas  :1950    Chief complaint: Follow-up coronary artery disease, PVCs, chronically elevated   troponin, dyslipidemia, and hypertriglyceridemia.    History of Present Illness:      I again had the pleasure of seeing your patient in cardiology office on 2021. As you   well know, he is a very pleasant 71 year-old white male with a past medical history   significant for FSH muscular dystrophy, frequent PVC's, coronary artery disease, and   dyslipidemia who presents for follow-up.       The patient was admitted on 2016 with palpitations for several days prior to the   admission. He had stated that his heart was \"skipping beats\". In the emergency   department, he was noted to have multiple PVCs, including patterns of bigeminy at   times. He had also had chest tightness and pressure 2 weeks prior to presentation,   and his troponin was elevated at 0.092 (cutoff for MI at 0.90). His troponin then came   back at 0.059 and again at 0.057 on the next 2 tests. He did wear a Holter monitor during   his hospital stay which showed an average of 110 PVCs per hour which were unifocal in   nature. He did have frequent bigeminal episodes, but no ventricular tachycardia. His TSH   was checked and was normal, and he was continued on his home dose of atenolol. He   then had a Lexiscan Cardiolite stress test performed on 2016 which showed   ischemia at the distal inferior wall and apex. A subsequent left heart catheterization was   performed on 2016 by Dr. August which showed significant disease in the LAD.   Specifically, he had 30% proximal disease, 50% mid disease, and 80-90% disease in the   apical portion of the LAD. However, the LAD was felt to be too small to intervene in the   apical area, and medical therapy was recommended. His ejection " fraction on the   ventriculogram was 55%. He was started on a very low dose of pravastatin at 10 mg per  day given his muscular dystrophy and a history of myalgias with statins in the past after   being diagnosed with this. He also has a history of significant dyslipidemia, including   grossly elevated triglycerides (greater than 1100 in the past), elevated LDL, and a low   HDL. It was later determined that he does have a chronically elevated troponin.  He   did develop memory impairment with pravastatin, but has tolerated low-dose Lipitor.      The patient presents today for follow-up.  He seems to be doing well.  His main issue   has been loss of strength in his upper limbs from his muscular dystrophy.  From a   cardiac standpoint, he is still able to exert himself without difficulty.  He has not had any   chest discomfort or significant shortness of breath.    Past Medical History:   Diagnosis Date   • Arthritis    • BPH (benign prostatic hyperplasia)    • Coronary artery disease     Cath 5/6/16: LAD 30% prox, 50% mid, 80-90% apical (small portion of vessel).  LCx and RCA both with up to 30% disease.  Medical management recommended at that time.   • Diverticulosis of colon    • Dyslipidemia     Including severe hypertriglyceridemia.  Also with elevated LDL and low HDL.   • Elevated troponin     Chronically elevated troponin (likely from muscular dystrophy)   • FSH (facioscapulohumeral muscular dystrophy) (CMS/HCC)    • Hearing aid worn     Bilateral    • Hypertension    • Hypogonadism male    • SANDRA on CPAP     compliant with CPAP machine   • PVC (premature ventricular contraction)     Frequent        Past Surgical History:   Procedure Laterality Date   • CARDIAC CATHETERIZATION N/A 5/6/2016    Procedure: Coronary angiography;  Surgeon: Ly August MD;  Location: [facility] INVASIVE LOCATION;  Service:    • CARDIAC CATHETERIZATION N/A 5/6/2016    Procedure: Left ventriculography;  Surgeon: Ly August MD;   [facility];  Service:    • CARDIAC CATHETERIZATION N/A 5/6/2016    Procedure: Left Heart Cath;  Surgeon: Ly August MD;  [facility];  Service:    • COLONOSCOPY     • COLONOSCOPY N/A 8/24/2020    Procedure: COLONOSCOPY INTO CECUM;  Surgeon: Kd Cottrell MD;  [facility] END[facility];  Service: General;  Laterality: N/A;  PRE:  HX OF POLYPS, FAMILY HX OF COLON CANCER  POST:  DIVERTICULOSIS   • HERNIA REPAIR      Bilateral inguinal hernia repair and umbilical hernia repair in past    • [place] RT/LT HEART CATHETERS N/A 5/6/2016    Procedure: Percutaneous Coronary Intervention;  Surgeon: Ly August MD;  [facility];  Service: Cardiovascular       Current Outpatient Medications on File Prior to Visit   Medication Sig Dispense Refill   • ASPIRIN LOW DOSE 81 MG EC tablet TAKE ONE TABLET BY MOUTH DAILY 30 tablet 6   • atenolol (TENORMIN) 100 MG tablet TAKE 1 TABLET BY MOUTH EVERY DAY 90 tablet 3   • atorvastatin (LIPITOR) 10 MG tablet TAKE 1 TABLET BY MOUTH EVERY DAY 90 tablet 3   • losartan (COZAAR) 100 MG tablet TAKE 1 TABLET BY MOUTH ONCE DAILY 90 tablet 4   • tadalafil (CIALIS) 5 MG tablet Take 5 mg by mouth As Needed.     • Testosterone 20.25 MG/ACT (1.62%) gel Place  on the skin as directed by provider Take As Directed.     • Vascepa 1 g capsule capsule TAKE 2 CAPSULES CAPSULES BY MOUTH TWICE DAILY WITH MEALS 120 capsule 10   • [DISCONTINUED] mupirocin (BACTROBAN) 2 % ointment Apply  topically to the appropriate area as directed 3 (Three) Times a Day. 22 g 0   • [DISCONTINUED] sulfamethoxazole-trimethoprim (Bactrim DS) 800-160 MG per tablet Take 1 tablet by mouth 2 (Two) Times a Day. 14 tablet 0     No current facility-administered medications on file prior to visit.     Allergies as of 09/23/2021 - Reviewed 09/23/2021   Allergen Reaction Noted   • Pravastatin  10/24/2017     Social History     Socioeconomic History   •  "Marital status:      Spouse name: Not on file   • Number of children: Not on file   • Years of education: Not on file   • Highest education level: Not on file   Tobacco Use   • Smoking status: Never Smoker   • Smokeless tobacco: Never Used   • Tobacco comment: caffeine use-coffee 2 cups daily   Substance and Sexual Activity   • Alcohol use: Yes     Comment: 3 beers on weekends   • Drug use: No   • Sexual activity: Defer     Family History   Problem Relation Age of Onset   • Aneurysm Father 50        Ruptured cerebral aneurysm   • Cancer Sister    • Early death Sister    • Dementia Sister        Review of Systems   Constitutional: Positive for malaise/fatigue.   HENT: Positive for hearing loss.    All other systems reviewed and are negative.     Objective:     Vitals:    09/23/21 0858   BP: 130/86   Pulse: 56   Weight: 85.3 kg (188 lb)   Height: 176.5 cm (69.49\")     Body mass index is 27.37 kg/m².    Physical Exam  Constitutional:       Appearance: He is well-developed.   HENT:      Head: Normocephalic and atraumatic.   Eyes:      Conjunctiva/sclera: Conjunctivae normal.   Cardiovascular:      Rate and Rhythm: Normal rate and regular rhythm.      Heart sounds: No murmur heard.   No friction rub. No gallop.    Pulmonary:      Effort: Pulmonary effort is normal.      Breath sounds: Normal breath sounds.   Abdominal:      Palpations: Abdomen is soft.      Tenderness: There is no abdominal tenderness.   Musculoskeletal:      Cervical back: Neck supple.   Skin:     General: Skin is warm.   Neurological:      Mental Status: He is alert and oriented to person, place, and time.   Psychiatric:         Behavior: Behavior normal.       Lab Review:     ECG 12 Lead    Date/Time: 9/23/2021 8:48 AM  Performed by: Yuan Vázquez MD  Authorized by: Yuan Vázquez MD   Comparison: compared with previous ECG from 9/17/2020  Similar to previous ECG  Rhythm: sinus bradycardia  Rate: bradycardic  BPM: 56    Clinical " impression: normal ECG            Cardiac Procedures:  1. Holter monitor on 5/5/2016: The average heart rate was 60 bpm with a minimum   heart rate of 44 bpm, and a maximum heart rate of 88 bpm. There were an average  of 110 PVCs per hour (which were unifocal). There was frequent ventricular bigeminy.   There were no ventricular arrhythmias.  2. Lexiscan Cardiolite stress test on 5/6/2016: There was a small to moderate size   and moderately reversible defect in the distal inferior wall and apex consistent with   ischemia.  3. Left heart catheterization on 5/6/2016 by Dr. August: The left main was normal. The   left circumflex had 30% proximal disease area the first obtuse marginal branch had   30% mid disease. The LAD had a 30% proximal lesion, 50% mid lesion, and 80-90%   disease near the apex (small portion of the vessel). Overall, the LAD appeared   diffusely diseased and severely calcified. The right coronary artery had 30% mid   disease and 10% distal disease. The ejection fraction was 55% on the   ventriculogram. Medical management was recommended at that time.       Assessment:       Diagnosis Plan   1. Coronary artery disease involving native coronary artery of native heart without angina pectoris     2. PVCs (premature ventricular contractions)     3. Dyslipidemia     4. Hypertriglyceridemia       Plan:       He seems to be doing well from a cardiac standpoint.  He has had no exertional symptoms, and his EKG from today is again normal.  He has felt very few PVCs since I last saw him, and states these have largely resolved or he is simply not feeling them anymore.  He is going to continue on the atenolol at 100 mg/day as he has done well with this.  His blood pressure has mainly been running approximately 130/80.  I feel this is acceptable and is improved.  He will continue on the atenolol and losartan for this.    He is on minimal statin medications with Lipitor at 10 mg/day.  This is because of his muscular  dystrophy, as he can only take minimal doses.  He did have memory impairment with pravastatin in the past.  He is taking Vascepa 1 g in the mornings, and 2 g in the evenings.  He is due to have his lipid panel checked soon.  He will continue on the aspirin for the coronary artery disease, and he is not having any angina.  He is going to [place] for the next 7 months.  I will see him in July 2021.  I did also encourage him to find a cardiologist in [place] and to establish care while he is there.

## 2021-10-29 RX ORDER — ICOSAPENT ETHYL 1000 MG/1
CAPSULE ORAL
Qty: 120 CAPSULE | Refills: 10 | Status: SHIPPED | OUTPATIENT
Start: 2021-10-29 | End: 2021-11-02

## 2021-11-02 RX ORDER — ICOSAPENT ETHYL 1000 MG/1
CAPSULE ORAL
Qty: 120 CAPSULE | Refills: 10 | Status: SHIPPED | OUTPATIENT
Start: 2021-11-02 | End: 2021-12-27

## 2021-12-20 RX ORDER — ATENOLOL 100 MG/1
TABLET ORAL
Qty: 90 TABLET | Refills: 3 | Status: SHIPPED | OUTPATIENT
Start: 2021-12-20 | End: 2023-01-10

## 2021-12-27 RX ORDER — ICOSAPENT ETHYL 1000 MG/1
CAPSULE ORAL
Qty: 120 CAPSULE | Refills: 10 | Status: SHIPPED | OUTPATIENT
Start: 2021-12-27

## 2022-07-13 ENCOUNTER — TELEPHONE (OUTPATIENT)
Dept: CARDIOLOGY | Facility: CLINIC | Age: 72
End: 2022-07-13

## 2022-07-13 DIAGNOSIS — E78.5 DYSLIPIDEMIA: Primary | ICD-10-CM

## 2022-07-13 NOTE — TELEPHONE ENCOUNTER
Marika, it looks like in the past this patient has had a lipid panel and hepatic function panel prior to appts. Could you please put these orders in? //HG

## 2022-07-13 NOTE — TELEPHONE ENCOUNTER
PT WAS ORIGINALLY SCHEDULED FOR 07/25/2022 WITH DR PILLAI HE IS NOW SCHEDULED WITH JONO LYMAN ON 08/11/2022. PT WAS WANTING TO KNOW IF HE COULD GET LABS DONE BEFORE HIS APPOINTMENT, THERE ARE NO LABS IN HIS CHART. PLEASE ADVISE?

## 2022-07-28 RX ORDER — LOSARTAN POTASSIUM 100 MG/1
TABLET ORAL
Qty: 90 TABLET | Refills: 4 | Status: SHIPPED | OUTPATIENT
Start: 2022-07-28

## 2022-08-08 ENCOUNTER — LAB (OUTPATIENT)
Dept: LAB | Facility: HOSPITAL | Age: 72
End: 2022-08-08

## 2022-08-08 DIAGNOSIS — E78.5 DYSLIPIDEMIA: ICD-10-CM

## 2022-08-08 LAB
ALBUMIN SERPL-MCNC: 4.1 G/DL (ref 3.5–5.2)
ALP SERPL-CCNC: 47 U/L (ref 39–117)
ALT SERPL W P-5'-P-CCNC: 19 U/L (ref 1–41)
AST SERPL-CCNC: 20 U/L (ref 1–40)
BILIRUB CONJ SERPL-MCNC: <0.2 MG/DL (ref 0–0.3)
BILIRUB INDIRECT SERPL-MCNC: NORMAL MG/DL
BILIRUB SERPL-MCNC: 0.4 MG/DL (ref 0–1.2)
CHOLEST SERPL-MCNC: 240 MG/DL (ref 0–200)
HDLC SERPL-MCNC: 47 MG/DL (ref 40–60)
LDLC SERPL CALC-MCNC: 144 MG/DL (ref 0–100)
LDLC/HDLC SERPL: 2.96 {RATIO}
PROT SERPL-MCNC: 6.4 G/DL (ref 6–8.5)
TRIGL SERPL-MCNC: 270 MG/DL (ref 0–150)
VLDLC SERPL-MCNC: 49 MG/DL (ref 5–40)

## 2022-08-08 PROCEDURE — 80061 LIPID PANEL: CPT

## 2022-08-08 PROCEDURE — 36415 COLL VENOUS BLD VENIPUNCTURE: CPT

## 2022-08-08 PROCEDURE — 80076 HEPATIC FUNCTION PANEL: CPT

## 2022-08-11 ENCOUNTER — OFFICE VISIT (OUTPATIENT)
Dept: CARDIOLOGY | Facility: CLINIC | Age: 72
End: 2022-08-11

## 2022-08-11 VITALS
HEART RATE: 64 BPM | BODY MASS INDEX: 27.46 KG/M2 | SYSTOLIC BLOOD PRESSURE: 118 MMHG | DIASTOLIC BLOOD PRESSURE: 80 MMHG | HEIGHT: 69 IN | WEIGHT: 185.4 LBS | OXYGEN SATURATION: 98 %

## 2022-08-11 DIAGNOSIS — E78.5 DYSLIPIDEMIA: ICD-10-CM

## 2022-08-11 DIAGNOSIS — E78.1 HYPERTRIGLYCERIDEMIA: ICD-10-CM

## 2022-08-11 DIAGNOSIS — I49.3 PVCS (PREMATURE VENTRICULAR CONTRACTIONS): ICD-10-CM

## 2022-08-11 DIAGNOSIS — I25.10 CORONARY ARTERY DISEASE INVOLVING NATIVE CORONARY ARTERY OF NATIVE HEART WITHOUT ANGINA PECTORIS: Primary | ICD-10-CM

## 2022-08-11 DIAGNOSIS — G47.33 OBSTRUCTIVE SLEEP APNEA SYNDROME: ICD-10-CM

## 2022-08-11 PROCEDURE — 99214 OFFICE O/P EST MOD 30 MIN: CPT | Performed by: NURSE PRACTITIONER

## 2022-08-11 RX ORDER — CHOLECALCIFEROL (VITAMIN D3) 50 MCG
2000 TABLET ORAL DAILY
COMMUNITY

## 2022-08-11 NOTE — PROGRESS NOTES
"    CARDIOLOGY        Patient Name: Jesus Islas  :1950  Age: 71 y.o.  Primary Cardiologist: Kevon Vázquez MD  Encounter Provider:  SHIRA Durán    Date of Service: 22            CHIEF COMPLAINT / REASON FOR OFFICE VISIT     Coronary Artery Disease (9 month f/u)      HISTORY OF PRESENT ILLNESS       HPI  Jesus Islas is a 71 y.o. male who presents today for 9-month follow-up.     Pt has a  history significant for CAD, hyperlipidemia, muscular dystrophy.    Patient reports that he has done well since last assessment, he reports that he is now living in [place] at least 9 months out of the year, he has now sold his home here and will only be back in [place] for brief visits.  He is asymptomatic and denies any episodes of chest pain, shortness of breath, palpitations, lightheadedness, swelling or fatigue.  He does exercise routinely.      The following portions of the patient's history were reviewed and updated as appropriate: allergies, current medications, past family history, past medical history, past social history, past surgical history and problem list.      VITAL SIGNS     Visit Vitals  /80 (BP Location: Left arm, Patient Position: Sitting, Cuff Size: Adult)   Pulse 64   Ht 175.3 cm (69\")   Wt 84.1 kg (185 lb 6.4 oz)   SpO2 98%   BMI 27.38 kg/m²         Wt Readings from Last 3 Encounters:   22 84.1 kg (185 lb 6.4 oz)   21 85.3 kg (188 lb)   21 85.7 kg (189 lb)     Body mass index is 27.38 kg/m².      REVIEW OF SYSTEMS   Review of Systems   Constitutional: Negative for chills, fever, weight gain and weight loss.   Cardiovascular: Negative for leg swelling.   Respiratory: Negative for cough, snoring and wheezing.    Hematologic/Lymphatic: Negative for bleeding problem. Does not bruise/bleed easily.   Skin: Negative for color change.   Musculoskeletal: Negative for falls, joint pain and myalgias.   Gastrointestinal: Negative for melena.   Genitourinary: Negative for " hematuria.   Neurological: Negative for excessive daytime sleepiness.   Psychiatric/Behavioral: Negative for depression. The patient is not nervous/anxious.            PHYSICAL EXAMINATION     Constitutional:       Appearance: Normal appearance. Well-developed.   Eyes:      Conjunctiva/sclera: Conjunctivae normal.   Neck:      Vascular: No carotid bruit.   Pulmonary:      Effort: Pulmonary effort is normal.      Breath sounds: Normal breath sounds.   Cardiovascular:      Normal rate. Regular rhythm. Normal S1. Normal S2.      Murmurs: There is no murmur.      No gallop. No click. No rub.   Edema:     Peripheral edema absent.   Musculoskeletal: Normal range of motion. Skin:     General: Skin is warm and dry.   Neurological:      Mental Status: Alert and oriented to person, place, and time.      GCS: GCS eye subscore is 4. GCS verbal subscore is 5. GCS motor subscore is 6.   Psychiatric:         Speech: Speech normal.         Behavior: Behavior normal.         Thought Content: Thought content normal.         Judgment: Judgment normal.           REVIEWED DATA     Procedures    Cardiac Procedures:  1. Cardiac catheterization 5/6/2016.  Left main normal.  Circumflex had 30% proximal disease.  First obtuse marginal branch had 30% mid disease.  LAD 30% proximal lesion, 50% mid lesion and 80 to 90% disease near the apex, small portion of the vessel.  Overall LAD appeared diffusely diseased and severely calcified.  RCA 30% mid disease and 10% distal disease.  LVEF 55%.  Recommend medical management.    Lipid Panel    Lipid Panel 8/8/22   Total Cholesterol 240 (A)   Triglycerides 270 (A)   HDL Cholesterol 47   VLDL Cholesterol 49 (A)   LDL Cholesterol  144 (A)   LDL/HDL Ratio 2.96   (A) Abnormal value            BUN   Date Value Ref Range Status   09/17/2020 15 8 - 23 mg/dL Final     Creatinine   Date Value Ref Range Status   09/17/2020 0.74 (L) 0.76 - 1.27 mg/dL Final     Potassium   Date Value Ref Range Status   09/17/2020  4.4 3.5 - 5.2 mmol/L Final     ALT (SGPT)   Date Value Ref Range Status   08/08/2022 19 1 - 41 U/L Final     AST (SGOT)   Date Value Ref Range Status   08/08/2022 20 1 - 40 U/L Final           ASSESSMENT & PLAN     Diagnoses and all orders for this visit:    1. Coronary artery disease involving native coronary artery of native heart without angina pectoris (Primary)  · Nonobstructive  · Denies angina and dyspnea  · Continue atorvastatin, losartan, aspirin, atenolol    2. Dyslipidemia  · Cholesterol panel is stable for patient.  He does have myalgias with higher doses of statin therapy.  · Continue atorvastatin 10 mg/day and Vascepa 1 g in the a.m. and 2 g in the p.m.  · Patient is very active and exercises very routinely, monitor his diet closely    3. PVCs (premature ventricular contractions)  · Controlled with atenolol    4. Hypertriglyceridemia  · Plan as above for #2    5. Obstructive sleep apnea syndrome  · Continue with CPAP          No follow-ups on file.    Future Appointments       Provider Department Center    8/19/2022 10:45 AM Kendal Quan APRN [facility] SLEEP MEDICINE                 MEDICATIONS         Discharge Medications          Accurate as of August 11, 2022  1:45 PM. If you have any questions, ask your nurse or doctor.            Continue These Medications      Instructions Start Date   ASPIRIN LOW DOSE 81 MG EC tablet  Generic drug: aspirin   TAKE ONE TABLET BY MOUTH DAILY      atenolol 100 MG tablet  Commonly known as: TENORMIN   TAKE 1 TABLET BY MOUTH EVERY DAY      atorvastatin 10 MG tablet  Commonly known as: LIPITOR   TAKE 1 TABLET BY MOUTH EVERY DAY      icosapent ethyl 1 g capsule capsule  Commonly known as: VASCEPA   TAKE 2 CAPSULES CAPSULES BY MOUTH TWICE DAILY WITH MEALS      losartan 100 MG tablet  Commonly known as: COZAAR   TAKE 1 TABLET BY MOUTH EVERY DAY      tadalafil 5 MG tablet  Commonly known as: CIALIS   5 mg, Oral, As Needed      Testosterone 20.25  MG/ACT (1.62%) gel   Transdermal, Take As Directed      Vitamin D 50 MCG (2000 UT) tablet   2,000 Units, Oral, Daily                 **Dragon Disclaimer:   Much of this encounter note is an electronic transcription/translation of spoken language to printed text. The electronic translation of spoken language may permit erroneous, or at times, nonsensical words or phrases to be inadvertently transcribed. Although I have reviewed the note for such errors, some may still exist.

## 2022-11-01 ENCOUNTER — TELEPHONE (OUTPATIENT)
Dept: CARDIOLOGY | Facility: CLINIC | Age: 72
End: 2022-11-01

## 2022-11-01 RX ORDER — ATORVASTATIN CALCIUM 10 MG/1
10 TABLET, FILM COATED ORAL DAILY
Qty: 90 TABLET | Refills: 3 | Status: SHIPPED | OUTPATIENT
Start: 2022-11-01

## 2022-11-01 NOTE — TELEPHONE ENCOUNTER
Notified Jesus Islas that requested refill has been sent.    Thank you,  Lisa Delgado RN  Triage Nurse G

## 2022-11-01 NOTE — TELEPHONE ENCOUNTER
Jesus Islas called to request refill of atorvastatin be sent to [facility] in [place], [place].    Patient reports he moved to [place] in September and has not gotten established with a new office yet.      LOV: 8/11/2022 with Marika Trujillo on 8/8/2022    Please let me know how you would like to proceed.    Thank you,  Lisa Delgado, RN  Triage Nurse KRYSTIN

## 2023-01-03 RX ORDER — ICOSAPENT ETHYL 1000 MG/1
CAPSULE ORAL
Qty: 120 CAPSULE | Refills: 10 | OUTPATIENT
Start: 2023-01-03

## 2023-01-10 RX ORDER — ATENOLOL 100 MG/1
TABLET ORAL
Qty: 90 TABLET | Refills: 3 | Status: SHIPPED | OUTPATIENT
Start: 2023-01-10

## 2023-01-27 RX ORDER — ICOSAPENT ETHYL 1000 MG/1
CAPSULE ORAL
Qty: 120 CAPSULE | Refills: 10 | OUTPATIENT
Start: 2023-01-27

## 2023-10-09 RX ORDER — ATENOLOL 100 MG/1
TABLET ORAL
Qty: 90 TABLET | Refills: 3 | OUTPATIENT
Start: 2023-10-09

## 2023-10-09 NOTE — TELEPHONE ENCOUNTER
LOV: 8/11/22    Per last office note:      Return for Patient is moving to [place] full-time, he will find a new cardiologist in [place].  .

## 2023-12-14 RX ORDER — ATORVASTATIN CALCIUM 10 MG/1
10 TABLET, FILM COATED ORAL DAILY
Qty: 90 TABLET | Refills: 3 | OUTPATIENT
Start: 2023-12-14

## 2023-12-14 NOTE — TELEPHONE ENCOUNTER
12/14/2023: CHRISTIAN -- moved to [place] // established with a new card there // no longer takes this medication

## 2025-03-07 NOTE — PROGRESS NOTES
Nick Desir  [address] [facility] [address]      2025      : 1962    Dear  Karlee:    We have been trying to reach you about any upper GI symptoms. Please call our office at [phone].    Thank you for your timely response.    Sincerely,         Jose Baltazar MD     [facility] SLEEP MEDICINE  [address] [facility]  [address]RD FLOOR  [facility] [address]  [phone]    PCP: Hallie Crockett APRN    Reason for visit:  Sleep disorders: SANDRA    Jesus is a 70 y.o.male who was seen in the Sleep Disorders Center today. Annual fu. Remains compliant with CPAP and benefits. Sleeps from 12mn to 9am. Using nasal mask and fits well. No air leaks. He wakes up rested and refreshed. No EDS. He replaces supplies regularly.  He was dx with MUSCULAR DYSTROPHY and having progressive weakness especially in UE's.  [place] Sleepiness Scale is 2. Caffeine 1 per day. Alcohol 2 per week.    Jesus  reports that he has never smoked. He has never used smokeless tobacco.    Pertinent Positive Review of Systems of denies  Rest of Review of Systems was negative as recorded in Sleep Questionnaire.    Patient  has a past medical history of Arthritis, BPH (benign prostatic hyperplasia), Coronary artery disease, Diverticulosis of colon, Dyslipidemia, Elevated troponin, FSH (facioscapulohumeral muscular dystrophy) (CMS/HCC), Hearing aid worn, Hypertension, Hypogonadism male, SANDRA on CPAP, and PVC (premature ventricular contraction).     Current Medications:    Current Outpatient Medications:   •  ASPIRIN LOW DOSE 81 MG EC tablet, TAKE ONE TABLET BY MOUTH DAILY, Disp: 30 tablet, Rfl: 6  •  atenolol (TENORMIN) 100 MG tablet, TAKE 1 TABLET BY MOUTH EVERY DAY, Disp: 90 tablet, Rfl: 3  •  atorvastatin (LIPITOR) 10 MG tablet, TAKE ONE TABLET BY MOUTH DAILY, Disp: 90 tablet, Rfl: 3  •  losartan (COZAAR) 100 MG tablet, TAKE 1 TABLET BY MOUTH ONCE DAILY, Disp: 90 tablet, Rfl: 4  •  mupirocin (BACTROBAN) 2 % ointment, Apply  topically to the appropriate area as directed 3 (Three) Times a Day., Disp: 22 g, Rfl: 0  •  sulfamethoxazole-trimethoprim (Bactrim DS) 800-160 MG per tablet, Take 1 tablet by mouth 2 (Two) Times a Day., Disp: 14 tablet, Rfl: 0  •  tadalafil (CIALIS) 5 MG tablet, Take  by mouth., Disp: , Rfl:   •   "Testosterone 20.25 MG/ACT (1.62%) gel, Place  on the skin., Disp: , Rfl:   •  Vascepa 1 g capsule capsule, TAKE 2 CAPSULES CAPSULES BY MOUTH TWICE DAILY WITH MEALS, Disp: 120 capsule, Rfl: 10   also entered in Sleep Questionnaire         Vital Signs: /85   Pulse 66   Ht 176.5 cm (69.5\")   Wt 85.7 kg (189 lb)   SpO2 97%   BMI 27.51 kg/m²     Body mass index is 27.51 kg/m².       Tongue: Large       Dentition: good       Pharynx: Posterior pharyngeal pillars are unable to see   Mallampatti: IV (only hard palate visible)        General: Alert. Cooperative. Well developed. No acute distress.             Head:  Normocephalic. Symmetrical. Atraumatic.              Nose: No septal deviation. No drainage.          Throat: No oral lesions. No thrush. Moist mucous membranes.    Chest Wall:  Normal shape. Symmetric expansion with respiration. No tenderness.             Neck:  Trachea midline.           Lungs:  Clear to auscultation bilaterally. No wheezes. No rhonchi. No rales. Respirations regular, even and unlabored.            Heart:  Regular rhythm and normal rate. Normal S1 and S2. No murmur.     Abdomen:  Soft, non-tender and non-distended. Normal bowel sounds. No masses.  Extremities:  Moves all extremities well. No edema.    Psychiatric: Normal mood and affect.    Diagnostic data available to date is as below and was reviewed on current visit:  · PSG 2007-severe SANDRA with AHI 37-treated with BIPAP 12/7.  · 11/18/19  Overnight split polysomnogram study.  Diagnostic study from 11:14 PM to 1:11 AM.  Sleep efficiency very poor at 52% with absence of slow-wave sleep and REM sleep.  AHI index 6.  Patient slept supine for 60 minutes.  There was absence of REM sleep.  Oxygen saturation remained above 88%.  Arousal index 35.  PLM index 15 with PLM arousal index 4.  Patient had 17% time snoring.  Titration study from 1:11 AM to 5:21 AM.  Sleep efficiency 87.5% with 3.64 hours of total sleep time.  Sleep distribution " shows rebound and slow-wave sleep to 32% and an increase in rem sleep to 16.7%.  AHI index is improved.  Adequate supine sleep at 218 minutes.  Patient at 36 minutes REM sleep.  Oxygen saturation remained above 88%.  Snoring not recorded.  CPAP increased from 5 to 11 cm water pressure.  Maximum sleep at 9 cm water pressure.  Maximum REM sleep at same pressure.  Supine and REM sleep was achieved at 9 cm water pressure.  Air fit F 20 fullface mask was used.    Most current available usage data reviewed on 08/20/2021:  · 99% compliance average 8 hours 16 minutes AHI 0.2 average pressure 10 cm auto CPAP 8-12    DME Company: [employer]    No orders of the defined types were placed in this encounter.         Prescription to [facility] for replacement supplies as below:    nasal mask      Description Replacement    Nasal PILLOWS      A 7034 Nasal Pillows  every 3 mth    A 7033 Repl Nasal Pillows  2 per mth    Nasal MASK/CUSHION     x A 7034 Nasal Mask/Cushion  every 3 mth   x A 7032 Repl Nasal Mask/Cushion  2 per mth    Full Face MASK      A 7030 Full Face Mask  every 3 mth    A 7031 Repl Face Mask  1 per mth      A 4604 Heated Tubing  every 3 mth    A 7037 Standard Tubing  every 3 mth   x A 7035 Headgear  every 3 mth   x A 7046 Repl Humidifier Chamber  every 6 yrs   x A 7038 Disposable Filters  2 per mth   x A 7039 Non-disposable Filter  every 6 mth   x A 7036 Chin Strap  every 6 mth           Impression:  1. Obstructive sleep apnea    2. Muscular dystrophy (CMS/[facility])        Plan:  Jesus is compliant.  He benefits from the CPAP and wakes up rested and refreshed.    He was diagnosed with muscular dystrophy.  Gets short of breath easily with walking up steps etc.  At rest without any difficulty so far.  Advised that if progressive disease then could compromise respiratory status in which case he should see us at the [facility] office for follow-up.    I reiterated the importance of effective treatment of obstructive sleep apnea with PAP  machine.  Cardiovascular health risks of untreated sleep apnea were again reviewed.  Patient was asked to remain cautious if there is persistent hypersomnolence. The benefit of weight loss in reducing severity of obstructive sleep apnea was discussed.  Patient would benefit from adhering to a strict diet to achieve ideal BMI.     Change of PAP supplies regularly is important for effective use.  Change of cushion on the mask or plugs on nasal pillows along with disposable filters once every month and change of mask frame, tubing, headgear and Velcro straps every 6 months at the minimum was reiterated.    This patient is compliant with PAP machine and benefits from its use.  Apnea hypopneas index is corrected/improved.  Daytime hypersomnolence has resolved.     Patient will follow up in this clinic in 1 year APRN    Thank you for allowing me to participate in your patient's care.    Electronically signed by Quoc Rey MD, 08/20/21, 10:18 AM EDT.    Part of this note may be an electronic transcription/translation of spoken language to printed text using the Dragon Dictation System.

## (undated) DEVICE — KT ORCA ORCAPOD DISP STRL

## (undated) DEVICE — SENSR O2 OXIMAX FNGR A/ 18IN NONSTR

## (undated) DEVICE — TUBING, SUCTION, 1/4" X 10', STRAIGHT: Brand: MEDLINE

## (undated) DEVICE — CANN O2 ETCO2 FITS ALL CONN CO2 SMPL A/ 7IN DISP LF

## (undated) DEVICE — LN SMPL CO2 SHTRM SD STREAM W/M LUER

## (undated) DEVICE — ADAPT CLN BIOGUARD AIR/H2O DISP

## (undated) DEVICE — THE TORRENT IRRIGATION SCOPE CONNECTOR IS USED WITH THE TORRENT IRRIGATION TUBING TO PROVIDE IRRIGATION FLUIDS SUCH AS STERILE WATER DURING GASTROINTESTINAL ENDOSCOPIC PROCEDURES WHEN USED IN CONJUNCTION WITH AN IRRIGATION PUMP (OR ELECTROSURGICAL UNIT).: Brand: TORRENT